# Patient Record
Sex: MALE | Race: ASIAN | NOT HISPANIC OR LATINO
[De-identification: names, ages, dates, MRNs, and addresses within clinical notes are randomized per-mention and may not be internally consistent; named-entity substitution may affect disease eponyms.]

---

## 2017-01-17 ENCOUNTER — APPOINTMENT (OUTPATIENT)
Dept: ORTHOPEDIC SURGERY | Facility: CLINIC | Age: 82
End: 2017-01-17

## 2017-01-30 ENCOUNTER — NON-APPOINTMENT (OUTPATIENT)
Age: 82
End: 2017-01-30

## 2017-01-30 ENCOUNTER — APPOINTMENT (OUTPATIENT)
Dept: CARDIOLOGY | Facility: CLINIC | Age: 82
End: 2017-01-30

## 2017-01-30 VITALS
BODY MASS INDEX: 29.63 KG/M2 | WEIGHT: 207 LBS | HEIGHT: 70 IN | OXYGEN SATURATION: 97 % | DIASTOLIC BLOOD PRESSURE: 74 MMHG | HEART RATE: 65 BPM | SYSTOLIC BLOOD PRESSURE: 137 MMHG

## 2017-01-30 DIAGNOSIS — I25.10 ATHEROSCLEROTIC HEART DISEASE OF NATIVE CORONARY ARTERY W/OUT ANGINA PECTORIS: ICD-10-CM

## 2017-04-03 ENCOUNTER — APPOINTMENT (OUTPATIENT)
Dept: ORTHOPEDIC SURGERY | Facility: CLINIC | Age: 82
End: 2017-04-03

## 2017-04-17 ENCOUNTER — APPOINTMENT (OUTPATIENT)
Dept: CARDIOLOGY | Facility: CLINIC | Age: 82
End: 2017-04-17

## 2017-04-17 ENCOUNTER — NON-APPOINTMENT (OUTPATIENT)
Age: 82
End: 2017-04-17

## 2017-04-17 VITALS
HEIGHT: 70 IN | DIASTOLIC BLOOD PRESSURE: 76 MMHG | BODY MASS INDEX: 29.63 KG/M2 | SYSTOLIC BLOOD PRESSURE: 126 MMHG | OXYGEN SATURATION: 95 % | WEIGHT: 207 LBS | HEART RATE: 67 BPM

## 2017-04-17 RX ORDER — CELECOXIB 400 MG/1
400 CAPSULE ORAL
Qty: 90 | Refills: 0 | Status: DISCONTINUED | COMMUNITY
Start: 2016-08-10 | End: 2017-04-17

## 2017-04-18 LAB
ALBUMIN SERPL ELPH-MCNC: 3.2 G/DL
ALP BLD-CCNC: 123 U/L
ALT SERPL-CCNC: 14 U/L
ANION GAP SERPL CALC-SCNC: 13 MMOL/L
AST SERPL-CCNC: 21 U/L
BILIRUB SERPL-MCNC: 0.5 MG/DL
BUN SERPL-MCNC: 17 MG/DL
CALCIUM SERPL-MCNC: 9.2 MG/DL
CHLORIDE SERPL-SCNC: 99 MMOL/L
CO2 SERPL-SCNC: 26 MMOL/L
CREAT SERPL-MCNC: 0.78 MG/DL
GLUCOSE SERPL-MCNC: 99 MG/DL
POTASSIUM SERPL-SCNC: 4.1 MMOL/L
PROT SERPL-MCNC: 7.5 G/DL
SODIUM SERPL-SCNC: 138 MMOL/L

## 2017-04-26 ENCOUNTER — APPOINTMENT (OUTPATIENT)
Dept: ORTHOPEDIC SURGERY | Facility: CLINIC | Age: 82
End: 2017-04-26

## 2017-05-22 ENCOUNTER — APPOINTMENT (OUTPATIENT)
Dept: CARDIOLOGY | Facility: CLINIC | Age: 82
End: 2017-05-22

## 2017-06-13 ENCOUNTER — APPOINTMENT (OUTPATIENT)
Dept: ORTHOPEDIC SURGERY | Facility: CLINIC | Age: 82
End: 2017-06-13

## 2017-06-29 ENCOUNTER — RX RENEWAL (OUTPATIENT)
Age: 82
End: 2017-06-29

## 2017-07-17 ENCOUNTER — NON-APPOINTMENT (OUTPATIENT)
Age: 82
End: 2017-07-17

## 2017-07-17 ENCOUNTER — APPOINTMENT (OUTPATIENT)
Dept: CARDIOLOGY | Facility: CLINIC | Age: 82
End: 2017-07-17

## 2017-07-17 VITALS
SYSTOLIC BLOOD PRESSURE: 118 MMHG | BODY MASS INDEX: 29.63 KG/M2 | OXYGEN SATURATION: 97 % | WEIGHT: 207 LBS | HEIGHT: 70 IN | DIASTOLIC BLOOD PRESSURE: 69 MMHG | HEART RATE: 78 BPM

## 2017-07-17 DIAGNOSIS — H54.7 UNSPECIFIED VISUAL LOSS: ICD-10-CM

## 2017-07-17 DIAGNOSIS — I35.0 NONRHEUMATIC AORTIC (VALVE) STENOSIS: ICD-10-CM

## 2017-07-17 RX ORDER — POLYETHYLENE GLYCOL 3350 17 G/17G
17 POWDER, FOR SOLUTION ORAL
Qty: 527 | Refills: 0 | Status: DISCONTINUED | COMMUNITY
Start: 2016-11-02 | End: 2017-07-17

## 2017-07-17 RX ORDER — MUPIROCIN 20 MG/G
2 OINTMENT TOPICAL
Qty: 66 | Refills: 0 | Status: DISCONTINUED | COMMUNITY
Start: 2016-09-07 | End: 2017-07-17

## 2017-07-17 RX ORDER — MOXIFLOXACIN HYDROCHLORIDE TABLETS, 400 MG 400 MG/1
400 TABLET, FILM COATED ORAL
Qty: 10 | Refills: 0 | Status: DISCONTINUED | COMMUNITY
Start: 2016-09-07 | End: 2017-07-17

## 2017-07-17 RX ORDER — DICLOFENAC EPOLAMINE 0.01 G/1
1.3 SYSTEM TOPICAL TWICE DAILY
Qty: 1 | Refills: 3 | Status: DISCONTINUED | OUTPATIENT
Start: 2017-04-03 | End: 2017-07-17

## 2017-07-19 ENCOUNTER — FORM ENCOUNTER (OUTPATIENT)
Age: 82
End: 2017-07-19

## 2017-07-20 ENCOUNTER — APPOINTMENT (OUTPATIENT)
Dept: ULTRASOUND IMAGING | Facility: HOSPITAL | Age: 82
End: 2017-07-20

## 2017-07-20 ENCOUNTER — OUTPATIENT (OUTPATIENT)
Dept: OUTPATIENT SERVICES | Facility: HOSPITAL | Age: 82
LOS: 1 days | End: 2017-07-20
Payer: MEDICARE

## 2017-07-20 DIAGNOSIS — H54.7 UNSPECIFIED VISUAL LOSS: ICD-10-CM

## 2017-07-20 PROCEDURE — 93880 EXTRACRANIAL BILAT STUDY: CPT

## 2017-07-26 ENCOUNTER — NON-APPOINTMENT (OUTPATIENT)
Age: 82
End: 2017-07-26

## 2017-07-26 ENCOUNTER — APPOINTMENT (OUTPATIENT)
Dept: ELECTROPHYSIOLOGY | Facility: CLINIC | Age: 82
End: 2017-07-26

## 2017-07-26 VITALS
BODY MASS INDEX: 29.63 KG/M2 | DIASTOLIC BLOOD PRESSURE: 71 MMHG | WEIGHT: 207 LBS | SYSTOLIC BLOOD PRESSURE: 125 MMHG | HEART RATE: 84 BPM | OXYGEN SATURATION: 98 % | HEIGHT: 70 IN

## 2017-07-26 DIAGNOSIS — R06.02 SHORTNESS OF BREATH: ICD-10-CM

## 2017-07-26 DIAGNOSIS — R00.1 BRADYCARDIA, UNSPECIFIED: ICD-10-CM

## 2017-07-26 RX ORDER — OXYCODONE AND ACETAMINOPHEN 5; 325 MG/1; MG/1
5-325 TABLET ORAL
Qty: 60 | Refills: 0 | Status: DISCONTINUED | COMMUNITY
Start: 2016-08-24 | End: 2017-07-26

## 2017-08-20 PROBLEM — R00.1 BRADYCARDIA: Status: ACTIVE | Noted: 2017-08-20

## 2017-09-11 ENCOUNTER — APPOINTMENT (OUTPATIENT)
Dept: CARDIOLOGY | Facility: CLINIC | Age: 82
End: 2017-09-11

## 2017-09-13 ENCOUNTER — APPOINTMENT (OUTPATIENT)
Dept: ORTHOPEDIC SURGERY | Facility: CLINIC | Age: 82
End: 2017-09-13
Payer: MEDICARE

## 2017-09-13 PROCEDURE — 99213 OFFICE O/P EST LOW 20 MIN: CPT | Mod: 25

## 2017-09-13 PROCEDURE — 20610 DRAIN/INJ JOINT/BURSA W/O US: CPT | Mod: 50

## 2017-09-13 RX ORDER — LIDOCAINE 5% 700 MG/1
5 PATCH TOPICAL
Qty: 1 | Refills: 2 | Status: ACTIVE | OUTPATIENT
Start: 2017-09-13

## 2017-10-12 ENCOUNTER — MEDICATION RENEWAL (OUTPATIENT)
Age: 82
End: 2017-10-12

## 2017-10-17 ENCOUNTER — OUTPATIENT (OUTPATIENT)
Dept: INPATIENT UNIT | Facility: HOSPITAL | Age: 82
LOS: 1 days | End: 2017-10-17
Payer: MEDICARE

## 2017-10-17 ENCOUNTER — TRANSCRIPTION ENCOUNTER (OUTPATIENT)
Age: 82
End: 2017-10-17

## 2017-10-17 VITALS
DIASTOLIC BLOOD PRESSURE: 84 MMHG | SYSTOLIC BLOOD PRESSURE: 173 MMHG | OXYGEN SATURATION: 98 % | TEMPERATURE: 98 F | HEIGHT: 70 IN | RESPIRATION RATE: 16 BRPM | HEART RATE: 79 BPM | WEIGHT: 207.01 LBS

## 2017-10-17 DIAGNOSIS — R00.1 BRADYCARDIA, UNSPECIFIED: ICD-10-CM

## 2017-10-17 LAB
ALBUMIN SERPL ELPH-MCNC: 4 G/DL — SIGNIFICANT CHANGE UP (ref 3.3–5)
ALP SERPL-CCNC: 143 U/L — HIGH (ref 40–120)
ALT FLD-CCNC: 12 U/L RC — SIGNIFICANT CHANGE UP (ref 10–45)
ANION GAP SERPL CALC-SCNC: 13 MMOL/L — SIGNIFICANT CHANGE UP (ref 5–17)
APTT BLD: 41.1 SEC — HIGH (ref 27.5–37.4)
AST SERPL-CCNC: 20 U/L — SIGNIFICANT CHANGE UP (ref 10–40)
BILIRUB SERPL-MCNC: 0.6 MG/DL — SIGNIFICANT CHANGE UP (ref 0.2–1.2)
BUN SERPL-MCNC: 12 MG/DL — SIGNIFICANT CHANGE UP (ref 7–23)
CALCIUM SERPL-MCNC: 9.4 MG/DL — SIGNIFICANT CHANGE UP (ref 8.4–10.5)
CHLORIDE SERPL-SCNC: 101 MMOL/L — SIGNIFICANT CHANGE UP (ref 96–108)
CO2 SERPL-SCNC: 28 MMOL/L — SIGNIFICANT CHANGE UP (ref 22–31)
CREAT SERPL-MCNC: 0.77 MG/DL — SIGNIFICANT CHANGE UP (ref 0.5–1.3)
GLUCOSE SERPL-MCNC: 107 MG/DL — HIGH (ref 70–99)
HCT VFR BLD CALC: 45.4 % — SIGNIFICANT CHANGE UP (ref 39–50)
HGB BLD-MCNC: 14.3 G/DL — SIGNIFICANT CHANGE UP (ref 13–17)
INR BLD: 1.55 RATIO — HIGH (ref 0.88–1.16)
MCHC RBC-ENTMCNC: 28.1 PG — SIGNIFICANT CHANGE UP (ref 27–34)
MCHC RBC-ENTMCNC: 31.5 GM/DL — LOW (ref 32–36)
MCV RBC AUTO: 89.1 FL — SIGNIFICANT CHANGE UP (ref 80–100)
PLATELET # BLD AUTO: 201 K/UL — SIGNIFICANT CHANGE UP (ref 150–400)
POTASSIUM SERPL-MCNC: 4.5 MMOL/L — SIGNIFICANT CHANGE UP (ref 3.5–5.3)
POTASSIUM SERPL-SCNC: 4.5 MMOL/L — SIGNIFICANT CHANGE UP (ref 3.5–5.3)
PROT SERPL-MCNC: 8.2 G/DL — SIGNIFICANT CHANGE UP (ref 6–8.3)
PROTHROM AB SERPL-ACNC: 16.9 SEC — HIGH (ref 9.8–12.7)
RBC # BLD: 5.1 M/UL — SIGNIFICANT CHANGE UP (ref 4.2–5.8)
RBC # FLD: 14.2 % — SIGNIFICANT CHANGE UP (ref 10.3–14.5)
SODIUM SERPL-SCNC: 142 MMOL/L — SIGNIFICANT CHANGE UP (ref 135–145)
WBC # BLD: 7.9 K/UL — SIGNIFICANT CHANGE UP (ref 3.8–10.5)
WBC # FLD AUTO: 7.9 K/UL — SIGNIFICANT CHANGE UP (ref 3.8–10.5)

## 2017-10-17 PROCEDURE — 93010 ELECTROCARDIOGRAM REPORT: CPT | Mod: 76

## 2017-10-17 PROCEDURE — 71010: CPT | Mod: 26

## 2017-10-17 PROCEDURE — 33208 INSRT HEART PM ATRIAL & VENT: CPT | Mod: KX

## 2017-10-17 RX ORDER — OXYCODONE AND ACETAMINOPHEN 5; 325 MG/1; MG/1
1 TABLET ORAL THREE TIMES A DAY
Qty: 0 | Refills: 0 | Status: DISCONTINUED | OUTPATIENT
Start: 2017-10-17 | End: 2017-10-18

## 2017-10-17 RX ORDER — FUROSEMIDE 40 MG
40 TABLET ORAL DAILY
Qty: 0 | Refills: 0 | Status: DISCONTINUED | OUTPATIENT
Start: 2017-10-17 | End: 2017-10-18

## 2017-10-17 RX ORDER — CHOLECALCIFEROL (VITAMIN D3) 125 MCG
5000 CAPSULE ORAL DAILY
Qty: 0 | Refills: 0 | Status: DISCONTINUED | OUTPATIENT
Start: 2017-10-17 | End: 2017-10-18

## 2017-10-17 RX ORDER — ASPIRIN/CALCIUM CARB/MAGNESIUM 324 MG
81 TABLET ORAL DAILY
Qty: 0 | Refills: 0 | Status: DISCONTINUED | OUTPATIENT
Start: 2017-10-17 | End: 2017-10-18

## 2017-10-17 RX ORDER — ACETAMINOPHEN 500 MG
650 TABLET ORAL EVERY 6 HOURS
Qty: 0 | Refills: 0 | Status: DISCONTINUED | OUTPATIENT
Start: 2017-10-17 | End: 2017-10-18

## 2017-10-17 RX ORDER — METOPROLOL TARTRATE 50 MG
50 TABLET ORAL
Qty: 0 | Refills: 0 | Status: DISCONTINUED | OUTPATIENT
Start: 2017-10-17 | End: 2017-10-18

## 2017-10-17 RX ORDER — ATORVASTATIN CALCIUM 80 MG/1
20 TABLET, FILM COATED ORAL AT BEDTIME
Qty: 0 | Refills: 0 | Status: DISCONTINUED | OUTPATIENT
Start: 2017-10-17 | End: 2017-10-18

## 2017-10-17 RX ORDER — CEFAZOLIN SODIUM 1 G
1000 VIAL (EA) INJECTION EVERY 8 HOURS
Qty: 0 | Refills: 0 | Status: COMPLETED | OUTPATIENT
Start: 2017-10-17 | End: 2017-10-18

## 2017-10-17 RX ADMIN — Medication 100 MILLIGRAM(S): at 20:42

## 2017-10-17 RX ADMIN — ATORVASTATIN CALCIUM 20 MILLIGRAM(S): 80 TABLET, FILM COATED ORAL at 20:42

## 2017-10-17 RX ADMIN — Medication 650 MILLIGRAM(S): at 19:11

## 2017-10-17 RX ADMIN — Medication 650 MILLIGRAM(S): at 18:41

## 2017-10-17 RX ADMIN — OXYCODONE AND ACETAMINOPHEN 1 TABLET(S): 5; 325 TABLET ORAL at 15:12

## 2017-10-17 RX ADMIN — Medication 50 MILLIGRAM(S): at 20:42

## 2017-10-17 NOTE — DISCHARGE NOTE ADULT - CARE PLAN
Principal Discharge DX:	Atrial fibrillation  Goal:	Your heart rate and rhythm will be controlled.  Instructions for follow-up, activity and diet:	Continue with your cardiologist and primary care MD. Continue your current medications. Call your physician for palpitations, feelings of rapid heart beat, lightheadedness, or dizziness. Continue Xarelto as directed by your doctor.  Secondary Diagnosis:	Hypercholesteremia  Goal:	Your LDL cholesterol will be less than 70mg/dL  Instructions for follow-up, activity and diet:	Continue with your cholesterol medications. Eat a heart healthy diet that is low in saturated fats and salt, and includes whole grains, fruits, vegetables and lean protein; exercise regularly (consult with your physician or cardiologist first); maintain a heart healthy weight; if you smoke - quit (A resource to help you stop smoking is the [facility] – phone number [phone].). Continue to follow with your primary physician or cardiologist.  Secondary Diagnosis:	CHF (congestive heart failure)  Goal:	You will not be short of breath.  Instructions for follow-up, activity and diet:	Take your medications as prescribed. Follow a low-salt, low salt, low cholesterol heart healthy diet. Weigh yourself every day and keep a record; call your doctor if you gain 2 pounds over one to two days or 5 pounds over three days. Get to or maintain a healthy weight; ask your heart failure team for referrals to a registered dietitian if needed. Avoid alcohol. Be active (check with your physician or cardiologist first). Find healthy ways to deal with stress, such as deep breathing, meditation, exercise, and doing hobbies that you enjoy. If you smoke, quit. (A resource to help you stop smoking is the [facility] – phone number [phone].).

## 2017-10-17 NOTE — DISCHARGE NOTE ADULT - PATIENT PORTAL LINK FT
“You can access the Follow[facility] Patient Portal, offered by [facility], by registering with the following website: [email or web address]”

## 2017-10-17 NOTE — DISCHARGE NOTE ADULT - MEDICATION SUMMARY - MEDICATIONS TO TAKE
I will START or STAY ON the medications listed below when I get home from the hospital:    Endocet 5/325 oral tablet  -- 1 tab(s) by mouth once a day  -- Indication: For severe pain    aspirin 81 mg oral tablet  -- 1 tab(s) by mouth once a day MDD:1  -- Indication: For Bradycardia    Tylenol 500 mg oral tablet  -- 1  by mouth once a day, As Needed  -- Indication: For mild to moderate pain    Xarelto 20 mg oral tablet  -- 1 tab(s) by mouth once a day (in the evening) MDD:1  -- Indication: For a-fib    Lipitor 20 mg oral tablet  -- 1 tab(s) by mouth once a day MDD:1  -- Indication: For cholesterol    metoprolol tartrate 50 mg oral tablet  -- 1 tab(s) by mouth 2 times a day MDD:2  -- Indication: For a-fib    Lasix 40 mg oral tablet  -- 1 tab(s) by mouth once a day MDD:1  -- Indication: For diuretics    Vitamin D3 5000 intl units oral capsule  -- 1 cap(s) by mouth once a day MDD:1  -- Indication: For supplement

## 2017-10-17 NOTE — PATIENT PROFILE ADULT. - VISION (WITH CORRECTIVE LENSES IF THE PATIENT USUALLY WEARS THEM):
katia glasses/Partially impaired: cannot see medication labels or newsprint, but can see obstacles in path, and the surrounding layout; can count fingers at arm's length
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
## 2017-10-17 NOTE — H&P CARDIOLOGY - HISTORY OF PRESENT ILLNESS
86 y/o male with h/o CAD and AS s/p CABG and AVR - bioprosthetic in 2012, LE Cellulitis and chronic LE edema, HLD and Afib on Xarelto evaluated by EPS in 7/2017 2/2 c/o dizziness x 2 months.  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  Patient is now for PPM implant for which he presents today.

## 2017-10-17 NOTE — H&P CARDIOLOGY - PSH
Aortic valve replaced  Bovine valve  H/O: knee surgery    History of appendectomy    S/P CABG x 1    S/P inguinal hernia repair  Right inguinal

## 2017-10-17 NOTE — PROGRESS NOTE ADULT - SUBJECTIVE AND OBJECTIVE BOX
S/P PPM implant   telemetry afib 80's  T(C): 36.7 (10-17-17 @ 14:45), Max: 36.8 (10-17-17 @ 09:22)  HR: 89 (10-17-17 @ 16:45) (79 - 89)  BP: 131/61 (10-17-17 @ 16:45) (119/61 - 173/84)  RR: 18 (10-17-17 @ 16:45) (16 - 18)  SpO2: 99% (10-17-17 @ 16:15) (98% - 99%)    Left infraclavicular implant site no hematoma, no bleeding, no ecchymosis  Post Device teaching done with patient and family  PPM card, booklet, and discharge instruction given to patient 's son   monitor telemetry over night   F/U cxray post op official read  to r/o pneumothorax and check lead tip placement  anticipate discharge home tomorrow after IV abx complete, telemetry stable with stable Vs and labs  dispo F/U in EP clinic in 7-10 days for wound check appointment, [phone], on 10/31/17 at 2;25 pm  KATELYN Fajardo NP [id number]

## 2017-10-17 NOTE — DISCHARGE NOTE ADULT - CARE PROVIDER_API CALL
Xiang Yung), Internal Medicine  [address] [place] [address] [id number]  Phone: [phone]  Fax: [phone]

## 2017-10-17 NOTE — DISCHARGE NOTE ADULT - ADDITIONAL INSTRUCTIONS
Follow direction as per EP nurses direction. Follow direction as per EP nurses direction.  Post Device teaching reinforced  with patient   stable on telemetry   F/U cxray post op official read  to r/o pneumothorax and check lead tip placement  anticipate discharge home today from EP perspective after IV abx complete,   dispo F/U in EP clinic in 7-10 days for wound check appointment, [phone]  on 10/31 at 2:25pm Follow direction as per EP nurses direction.  Post Device teaching reinforced  with patient   stable on telemetry   F/U cxray negative for pneumothorax   anticipate discharge home today from EP perspective after IV abx complete,   dispo F/U in EP clinic in 7-10 days for wound check appointment, [phone]  on 10/31 at 2:25pm

## 2017-10-17 NOTE — DISCHARGE NOTE ADULT - HOSPITAL COURSE
86 y/o male with h/o CAD and AS s/p CABG and AVR - bioprosthetic in 2012, LE Cellulitis and chronic LE edema, HLD and Afib on Xarelto evaluated by EPS in 7/2017 2/2 c/o dizziness x 2 months.  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  Patient is now for PPM implant. S/p PPM insertion, VSS. 84 y/o male with h/o CAD and AS s/p CABG and AVR - bioprosthetic in 2012, LE Cellulitis and chronic LE edema, HLD and Afib on Xarelto evaluated by EPS in 7/2017 2/2 c/o dizziness x 2 months.  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  Patient is now for PPM implant. S/p PPM insertion- site stable. Patient stable for discharge today. PT evaluated patient recommended home PT vs. subacute, family is requesting home PT with ambulance to go home.

## 2017-10-17 NOTE — DISCHARGE NOTE ADULT - PLAN OF CARE
Take your medications as prescribed. Follow a low-salt, low salt, low cholesterol heart healthy diet. Weigh yourself every day and keep a record; call your doctor if you gain 2 pounds over one to two days or 5 pounds over three days. Get to or maintain a healthy weight; ask your heart failure team for referrals to a registered dietitian if needed. Avoid alcohol. Be active (check with your physician or cardiologist first). Find healthy ways to deal with stress, such as deep breathing, meditation, exercise, and doing hobbies that you enjoy. If you smoke, quit. (A resource to help you stop smoking is the [facility] – phone number [phone].). Your heart rate and rhythm will be controlled. Continue with your cardiologist and primary care MD. Continue your current medications. Call your physician for palpitations, feelings of rapid heart beat, lightheadedness, or dizziness. Continue Xarelto as directed by your doctor. Your LDL cholesterol will be less than 70mg/dL Continue with your cholesterol medications. Eat a heart healthy diet that is low in saturated fats and salt, and includes whole grains, fruits, vegetables and lean protein; exercise regularly (consult with your physician or cardiologist first); maintain a heart healthy weight; if you smoke - quit (A resource to help you stop smoking is the [facility] – phone number [phone].). Continue to follow with your primary physician or cardiologist. You will not be short of breath.

## 2017-10-17 NOTE — DISCHARGE NOTE ADULT - CONDITION (STATED IN TERMS THAT PERMIT A SPECIFIC MEASURABLE COMPARISON WITH CONDITION ON ADMISSION):
at time of discharge patient is ambulated with walker and 1 person assist. site WDL, no hematoma, CXR at time of discharge patient is ambulated with walker and 1 person assist. site WDL, no hematoma, CXR negative for pneuo

## 2017-10-18 VITALS
RESPIRATION RATE: 16 BRPM | TEMPERATURE: 98 F | OXYGEN SATURATION: 98 % | HEART RATE: 50 BPM | DIASTOLIC BLOOD PRESSURE: 58 MMHG | SYSTOLIC BLOOD PRESSURE: 107 MMHG

## 2017-10-18 DIAGNOSIS — R00.1 BRADYCARDIA, UNSPECIFIED: ICD-10-CM

## 2017-10-18 DIAGNOSIS — I48.91 UNSPECIFIED ATRIAL FIBRILLATION: ICD-10-CM

## 2017-10-18 LAB
ANION GAP SERPL CALC-SCNC: 12 MMOL/L — SIGNIFICANT CHANGE UP (ref 5–17)
BUN SERPL-MCNC: 13 MG/DL — SIGNIFICANT CHANGE UP (ref 7–23)
CALCIUM SERPL-MCNC: 8.8 MG/DL — SIGNIFICANT CHANGE UP (ref 8.4–10.5)
CHLORIDE SERPL-SCNC: 103 MMOL/L — SIGNIFICANT CHANGE UP (ref 96–108)
CO2 SERPL-SCNC: 26 MMOL/L — SIGNIFICANT CHANGE UP (ref 22–31)
CREAT SERPL-MCNC: 0.68 MG/DL — SIGNIFICANT CHANGE UP (ref 0.5–1.3)
GLUCOSE SERPL-MCNC: 101 MG/DL — HIGH (ref 70–99)
HCT VFR BLD CALC: 40.3 % — SIGNIFICANT CHANGE UP (ref 39–50)
HGB BLD-MCNC: 13.6 G/DL — SIGNIFICANT CHANGE UP (ref 13–17)
MCHC RBC-ENTMCNC: 29.9 PG — SIGNIFICANT CHANGE UP (ref 27–34)
MCHC RBC-ENTMCNC: 33.8 GM/DL — SIGNIFICANT CHANGE UP (ref 32–36)
MCV RBC AUTO: 88.5 FL — SIGNIFICANT CHANGE UP (ref 80–100)
PLATELET # BLD AUTO: 182 K/UL — SIGNIFICANT CHANGE UP (ref 150–400)
POTASSIUM SERPL-MCNC: 4.2 MMOL/L — SIGNIFICANT CHANGE UP (ref 3.5–5.3)
POTASSIUM SERPL-SCNC: 4.2 MMOL/L — SIGNIFICANT CHANGE UP (ref 3.5–5.3)
RBC # BLD: 4.56 M/UL — SIGNIFICANT CHANGE UP (ref 4.2–5.8)
RBC # FLD: 14.2 % — SIGNIFICANT CHANGE UP (ref 10.3–14.5)
SODIUM SERPL-SCNC: 141 MMOL/L — SIGNIFICANT CHANGE UP (ref 135–145)
WBC # BLD: 7 K/UL — SIGNIFICANT CHANGE UP (ref 3.8–10.5)
WBC # FLD AUTO: 7 K/UL — SIGNIFICANT CHANGE UP (ref 3.8–10.5)

## 2017-10-18 PROCEDURE — C1892: CPT

## 2017-10-18 PROCEDURE — 71045 X-RAY EXAM CHEST 1 VIEW: CPT

## 2017-10-18 PROCEDURE — C1785: CPT

## 2017-10-18 PROCEDURE — G8980: CPT

## 2017-10-18 PROCEDURE — 85730 THROMBOPLASTIN TIME PARTIAL: CPT

## 2017-10-18 PROCEDURE — 93005 ELECTROCARDIOGRAM TRACING: CPT

## 2017-10-18 PROCEDURE — 85610 PROTHROMBIN TIME: CPT

## 2017-10-18 PROCEDURE — C1898: CPT

## 2017-10-18 PROCEDURE — G8979: CPT

## 2017-10-18 PROCEDURE — 80048 BASIC METABOLIC PNL TOTAL CA: CPT

## 2017-10-18 PROCEDURE — 93010 ELECTROCARDIOGRAM REPORT: CPT

## 2017-10-18 PROCEDURE — 80053 COMPREHEN METABOLIC PANEL: CPT

## 2017-10-18 PROCEDURE — G8978: CPT

## 2017-10-18 PROCEDURE — 97161 PT EVAL LOW COMPLEX 20 MIN: CPT

## 2017-10-18 PROCEDURE — 85027 COMPLETE CBC AUTOMATED: CPT

## 2017-10-18 PROCEDURE — 33208 INSRT HEART PM ATRIAL & VENT: CPT | Mod: KX

## 2017-10-18 PROCEDURE — C1894: CPT

## 2017-10-18 RX ADMIN — Medication 40 MILLIGRAM(S): at 05:06

## 2017-10-18 RX ADMIN — Medication 50 MILLIGRAM(S): at 05:06

## 2017-10-18 RX ADMIN — OXYCODONE AND ACETAMINOPHEN 1 TABLET(S): 5; 325 TABLET ORAL at 13:00

## 2017-10-18 RX ADMIN — OXYCODONE AND ACETAMINOPHEN 1 TABLET(S): 5; 325 TABLET ORAL at 12:05

## 2017-10-18 RX ADMIN — Medication 650 MILLIGRAM(S): at 04:53

## 2017-10-18 RX ADMIN — Medication 81 MILLIGRAM(S): at 12:05

## 2017-10-18 RX ADMIN — Medication 100 MILLIGRAM(S): at 12:05

## 2017-10-18 RX ADMIN — Medication 100 MILLIGRAM(S): at 04:30

## 2017-10-18 RX ADMIN — Medication 650 MILLIGRAM(S): at 05:23

## 2017-10-18 NOTE — PROGRESS NOTE ADULT - PROBLEM SELECTOR PLAN 2
resume Xarelto for AC tonight  c/w metoprolol for rate control   F/U PCP and primary cardiology 7 -10 days after discharge from the hospital resume Xarelto for AC tonight  monitor implant pocket for bleeding with AC  c/w metoprolol for rate control   F/U PCP and primary cardiology 7 -10 days after discharge from the hospital

## 2017-10-18 NOTE — PROGRESS NOTE ADULT - SUBJECTIVE AND OBJECTIVE BOX
HPI: no over night events    MEDICATIONS  (STANDING):  aspirin enteric coated 81 milliGRAM(s) Oral daily  atorvastatin 20 milliGRAM(s) Oral at bedtime  ceFAZolin   IVPB 1000 milliGRAM(s) IV Intermittent every 8 hours  cholecalciferol 5000 Unit(s) Oral daily  furosemide    Tablet 40 milliGRAM(s) Oral daily  metoprolol 50 milliGRAM(s) Oral two times a day    MEDICATIONS  (PRN):  acetaminophen   Tablet. 650 milliGRAM(s) Oral every 6 hours PRN Moderate Pain (4 - 6)  oxyCODONE    5 mG/acetaminophen 325 mG 1 Tablet(s) Oral three times a day PRN Severe Pain (7 - 10)    Allergies No Known Drug Allergies  Seafood (Unknown)    Intolerances    REVIEW OF SYSTEM:    Constitutional: denies fever, chills, fatigue  Neuro: denies headache, numbness, weakness, dizziness  Resp: denies cough, wheezing, shortness of breath  CVS: denies chest pain, palpitations,  + leg swelling  GI: denies abdominal pain, nausea, vomiting, diarrhea   : denies dysuria, frequency, incontinence  Skin: denies itching, burning, rashes, or lesions   Msk: c/o device implant site pain     Vital Signs Last 24 Hrs  T(C): 36.4 (18 Oct 2017 07:40), Max: 36.7 (17 Oct 2017 14:45)  T(F): 97.6 (18 Oct 2017 07:40), Max: 98 (17 Oct 2017 14:45)  HR: 66 (18 Oct 2017 07:40) (66 - 92)  BP: 126/52 (18 Oct 2017 07:40) (113/62 - 139/70)  RR: 18 (18 Oct 2017 07:40) (16 - 18)  SpO2: 99% (18 Oct 2017 07:40) (97% - 99%)    Physical Exam:  General : frail elderly  no acute distress  Neuro : Alert and oriented x 3  HEENT : neck supple   Lungs: Clear to Ascultation, no wheezing , rales or rhonchi   Cardiovascular : + 1 +2, RRR, no murmurs, no rubs  GI : abdomen soft, NT, ND, + BS   : no suprapubic tenderness  Extremities : lower extremity edema feet warm   Skin : Left infraclavicular implant site intact,  no hematoma, no bleeding,  no ecchymosis,     TELE:SR ST 80 -100, occ AV paced V paced   EKG: SR 66 bpm 1st degree AVB V paced      LABS:                        13.6   7.0   )-----------( 182      ( 18 Oct 2017 04:37 )             40.3     10-18    141  |  103  |  13  ----------------------------<  101<H>  4.2   |  26  |  0.68    Ca    8.8      18 Oct 2017 04:37    TPro  8.2  /  Alb  4.0  /  TBili  0.6  /  DBili  x   /  AST  20  /  ALT  12  /  AlkPhos  143<H>  10-17    PT/INR - ( 17 Oct 2017 09:32 )   PT: 16.9 sec;   INR: 1.55 ratio    PTT - ( 17 Oct 2017 09:32 )  PTT:41.1 sec    RADIOLOGY & ADDITIONAL TESTS:  Chest Xray pending official report

## 2017-10-18 NOTE — PHYSICAL THERAPY INITIAL EVALUATION ADULT - DISCHARGE DISPOSITION, PT EVAL
rehabilitation facility/subacute rehab; if d/c'ed home pt will require home PT, assist with all OOB activity and RW for ambulation (pt owns)

## 2017-10-18 NOTE — PHYSICAL THERAPY INITIAL EVALUATION ADULT - PERTINENT HX OF CURRENT PROBLEM, REHAB EVAL
84 y/o male with h/o CAD and AS s/p CABG and AVR - bioprosthetic in 2012, LE Cellulitis and chronic LE edema, HLD and Afib on Xarelto evaluated by EPS in 7/2017 2/2 c/o dizziness x 2 months.  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  Patient is now for PPM implant.

## 2017-10-18 NOTE — PROGRESS NOTE ADULT - PROBLEM SELECTOR PLAN 1
s/p PPM implant 10/17/17  Post Device teaching reinforced  with patient   stable on telemetry   F/U cxray post op offical read  to r/o pneumothorax and check lead tip placement  anticipate discharge home today from EP perspective after IV abx complete,   dispo F/U in EP clinic in 7-10 days for wound check appointment, [phone]  on 10/31 at 2:25pm    KATELYN Fajardo NP [id number]

## 2017-10-18 NOTE — PROGRESS NOTE ADULT - ASSESSMENT
86 y/o male with h/o HLD, CAD and AS s/p CABG AVR - bioprosthetic in 2012, HFrEF 45% LE Cellulitis and chronic LE edema, Afib on Xarelto evaluated by EPS in 7/2017 2/2 c/o dizziness x 2 months.  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 S/P PPM implant 10/17/17

## 2017-10-31 ENCOUNTER — APPOINTMENT (OUTPATIENT)
Dept: ELECTROPHYSIOLOGY | Facility: CLINIC | Age: 82
End: 2017-10-31
Payer: MEDICARE

## 2017-10-31 ENCOUNTER — NON-APPOINTMENT (OUTPATIENT)
Age: 82
End: 2017-10-31

## 2017-10-31 VITALS
BODY MASS INDEX: 29.63 KG/M2 | WEIGHT: 207 LBS | HEIGHT: 70 IN | RESPIRATION RATE: 16 BRPM | HEART RATE: 63 BPM | SYSTOLIC BLOOD PRESSURE: 113 MMHG | OXYGEN SATURATION: 94 % | DIASTOLIC BLOOD PRESSURE: 76 MMHG

## 2017-10-31 PROCEDURE — 99024 POSTOP FOLLOW-UP VISIT: CPT

## 2017-11-13 ENCOUNTER — APPOINTMENT (OUTPATIENT)
Dept: CARDIOLOGY | Facility: CLINIC | Age: 82
End: 2017-11-13
Payer: MEDICARE

## 2017-11-13 ENCOUNTER — NON-APPOINTMENT (OUTPATIENT)
Age: 82
End: 2017-11-13

## 2017-11-13 VITALS
DIASTOLIC BLOOD PRESSURE: 73 MMHG | HEIGHT: 70 IN | OXYGEN SATURATION: 99 % | HEART RATE: 65 BPM | SYSTOLIC BLOOD PRESSURE: 114 MMHG

## 2017-11-13 DIAGNOSIS — R78.81 BACTEREMIA: ICD-10-CM

## 2017-11-13 PROCEDURE — 93000 ELECTROCARDIOGRAM COMPLETE: CPT

## 2017-11-13 PROCEDURE — 99215 OFFICE O/P EST HI 40 MIN: CPT

## 2017-11-15 ENCOUNTER — NON-APPOINTMENT (OUTPATIENT)
Age: 82
End: 2017-11-15

## 2017-11-15 ENCOUNTER — APPOINTMENT (OUTPATIENT)
Dept: ELECTROPHYSIOLOGY | Facility: CLINIC | Age: 82
End: 2017-11-15
Payer: MEDICARE

## 2017-11-15 VITALS — SYSTOLIC BLOOD PRESSURE: 114 MMHG | DIASTOLIC BLOOD PRESSURE: 77 MMHG

## 2017-11-15 PROCEDURE — 99024 POSTOP FOLLOW-UP VISIT: CPT

## 2017-11-15 PROCEDURE — 93000 ELECTROCARDIOGRAM COMPLETE: CPT

## 2017-11-15 RX ORDER — OXYCODONE AND ACETAMINOPHEN 5; 325 MG/1; MG/1
5-325 TABLET ORAL
Refills: 0 | Status: DISCONTINUED | COMMUNITY
Start: 2017-07-17 | End: 2017-11-15

## 2017-11-15 RX ORDER — ASPIRIN ENTERIC COATED TABLETS 81 MG 81 MG/1
81 TABLET, DELAYED RELEASE ORAL
Refills: 0 | Status: ACTIVE | COMMUNITY

## 2017-11-15 RX ORDER — LIDOCAINE 5 G/100G
5 OINTMENT TOPICAL
Qty: 3 | Refills: 2 | Status: DISCONTINUED | OUTPATIENT
Start: 2017-09-13 | End: 2017-11-15

## 2017-11-23 ENCOUNTER — NON-APPOINTMENT (OUTPATIENT)
Age: 82
End: 2017-11-23

## 2017-12-06 ENCOUNTER — APPOINTMENT (OUTPATIENT)
Dept: ORTHOPEDIC SURGERY | Facility: CLINIC | Age: 82
End: 2017-12-06
Payer: MEDICARE

## 2017-12-06 PROCEDURE — 99213 OFFICE O/P EST LOW 20 MIN: CPT | Mod: 25

## 2017-12-06 PROCEDURE — 20610 DRAIN/INJ JOINT/BURSA W/O US: CPT | Mod: 50

## 2017-12-18 ENCOUNTER — APPOINTMENT (OUTPATIENT)
Dept: CARDIOLOGY | Facility: CLINIC | Age: 82
End: 2017-12-18

## 2018-02-06 ENCOUNTER — APPOINTMENT (OUTPATIENT)
Dept: ORTHOPEDIC SURGERY | Facility: CLINIC | Age: 83
End: 2018-02-06
Payer: MEDICARE

## 2018-02-06 DIAGNOSIS — Z47.1 AFTERCARE FOLLOWING JOINT REPLACEMENT SURGERY: ICD-10-CM

## 2018-02-06 PROCEDURE — 99213 OFFICE O/P EST LOW 20 MIN: CPT | Mod: 25

## 2018-02-06 PROCEDURE — 20610 DRAIN/INJ JOINT/BURSA W/O US: CPT | Mod: 50

## 2018-02-07 ENCOUNTER — CHART COPY (OUTPATIENT)
Age: 83
End: 2018-02-07

## 2018-02-08 ENCOUNTER — MEDICATION RENEWAL (OUTPATIENT)
Age: 83
End: 2018-02-08

## 2018-03-28 ENCOUNTER — MEDICATION RENEWAL (OUTPATIENT)
Age: 83
End: 2018-03-28

## 2018-04-30 ENCOUNTER — NON-APPOINTMENT (OUTPATIENT)
Age: 83
End: 2018-04-30

## 2018-04-30 ENCOUNTER — APPOINTMENT (OUTPATIENT)
Dept: CARDIOLOGY | Facility: CLINIC | Age: 83
End: 2018-04-30
Payer: MEDICARE

## 2018-04-30 VITALS
BODY MASS INDEX: 29.63 KG/M2 | WEIGHT: 207 LBS | SYSTOLIC BLOOD PRESSURE: 137 MMHG | HEART RATE: 66 BPM | DIASTOLIC BLOOD PRESSURE: 60 MMHG | OXYGEN SATURATION: 99 % | HEIGHT: 70 IN

## 2018-04-30 DIAGNOSIS — R60.9 EDEMA, UNSPECIFIED: ICD-10-CM

## 2018-04-30 PROCEDURE — 99215 OFFICE O/P EST HI 40 MIN: CPT

## 2018-04-30 PROCEDURE — 93000 ELECTROCARDIOGRAM COMPLETE: CPT

## 2018-04-30 RX ORDER — METRONIDAZOLE 500 MG/1
500 TABLET ORAL 3 TIMES DAILY
Refills: 0 | Status: DISCONTINUED | COMMUNITY
Start: 2017-11-13 | End: 2018-04-30

## 2018-05-15 ENCOUNTER — APPOINTMENT (OUTPATIENT)
Dept: ORTHOPEDIC SURGERY | Facility: CLINIC | Age: 83
End: 2018-05-15
Payer: MEDICARE

## 2018-05-15 PROCEDURE — 20610 DRAIN/INJ JOINT/BURSA W/O US: CPT | Mod: 50

## 2018-05-15 PROCEDURE — 99213 OFFICE O/P EST LOW 20 MIN: CPT | Mod: 25

## 2018-08-07 ENCOUNTER — APPOINTMENT (OUTPATIENT)
Dept: ORTHOPEDIC SURGERY | Facility: CLINIC | Age: 83
End: 2018-08-07
Payer: MEDICARE

## 2018-08-07 VITALS
HEART RATE: 71 BPM | SYSTOLIC BLOOD PRESSURE: 136 MMHG | DIASTOLIC BLOOD PRESSURE: 72 MMHG | HEIGHT: 70 IN | WEIGHT: 207 LBS | BODY MASS INDEX: 29.63 KG/M2

## 2018-08-07 DIAGNOSIS — Z82.61 FAMILY HISTORY OF ARTHRITIS: ICD-10-CM

## 2018-08-07 DIAGNOSIS — Z87.39 PERSONAL HISTORY OF OTHER DISEASES OF THE MUSCULOSKELETAL SYSTEM AND CONNECTIVE TISSUE: ICD-10-CM

## 2018-08-07 PROCEDURE — 20610 DRAIN/INJ JOINT/BURSA W/O US: CPT | Mod: 50

## 2018-08-07 PROCEDURE — 99213 OFFICE O/P EST LOW 20 MIN: CPT | Mod: 25

## 2018-10-05 ENCOUNTER — RX RENEWAL (OUTPATIENT)
Age: 83
End: 2018-10-05

## 2018-10-05 RX ORDER — OXYCODONE AND ACETAMINOPHEN 5; 325 MG/1; MG/1
5-325 TABLET ORAL EVERY 6 HOURS
Qty: 30 | Refills: 0 | Status: ACTIVE | COMMUNITY
Start: 2018-06-28 | End: 1900-01-01

## 2018-10-30 ENCOUNTER — APPOINTMENT (OUTPATIENT)
Dept: ORTHOPEDIC SURGERY | Facility: CLINIC | Age: 83
End: 2018-10-30
Payer: MEDICARE

## 2018-10-30 PROCEDURE — 99213 OFFICE O/P EST LOW 20 MIN: CPT | Mod: 25

## 2018-10-30 PROCEDURE — 20610 DRAIN/INJ JOINT/BURSA W/O US: CPT | Mod: 50

## 2018-10-30 RX ORDER — OXYCODONE AND ACETAMINOPHEN 5; 325 MG/1; MG/1
5-325 TABLET ORAL EVERY 6 HOURS
Qty: 120 | Refills: 0 | Status: ACTIVE | COMMUNITY
Start: 2018-10-30 | End: 1900-01-01

## 2018-10-30 RX ORDER — LIDOCAINE 5 G/100G
5 OINTMENT TOPICAL
Qty: 1 | Refills: 5 | Status: ACTIVE | COMMUNITY
Start: 2018-10-30 | End: 1900-01-01

## 2019-01-01 ENCOUNTER — APPOINTMENT (OUTPATIENT)
Dept: ELECTROPHYSIOLOGY | Facility: CLINIC | Age: 84
End: 2019-01-01

## 2019-01-01 ENCOUNTER — APPOINTMENT (OUTPATIENT)
Dept: ORTHOPEDIC SURGERY | Facility: CLINIC | Age: 84
End: 2019-01-01
Payer: MEDICARE

## 2019-01-01 ENCOUNTER — OUTPATIENT (OUTPATIENT)
Dept: OUTPATIENT SERVICES | Facility: HOSPITAL | Age: 84
LOS: 1 days | End: 2019-01-01
Payer: MEDICARE

## 2019-01-01 ENCOUNTER — OUTPATIENT (OUTPATIENT)
Dept: OUTPATIENT SERVICES | Facility: HOSPITAL | Age: 84
LOS: 1 days | Discharge: HOME | End: 2019-01-01
Payer: MEDICARE

## 2019-01-01 ENCOUNTER — NON-APPOINTMENT (OUTPATIENT)
Age: 84
End: 2019-01-01

## 2019-01-01 ENCOUNTER — APPOINTMENT (OUTPATIENT)
Dept: WOUND CARE | Facility: HOSPITAL | Age: 84
End: 2019-01-01
Payer: MEDICARE

## 2019-01-01 ENCOUNTER — APPOINTMENT (OUTPATIENT)
Dept: SURGERY | Facility: HOSPITAL | Age: 84
End: 2019-01-01

## 2019-01-01 ENCOUNTER — APPOINTMENT (OUTPATIENT)
Dept: ELECTROPHYSIOLOGY | Facility: CLINIC | Age: 84
End: 2019-01-01
Payer: MEDICARE

## 2019-01-01 ENCOUNTER — APPOINTMENT (OUTPATIENT)
Dept: CARDIOLOGY | Facility: CLINIC | Age: 84
End: 2019-01-01
Payer: MEDICARE

## 2019-01-01 ENCOUNTER — MEDICATION RENEWAL (OUTPATIENT)
Age: 84
End: 2019-01-01

## 2019-01-01 VITALS
OXYGEN SATURATION: 95 % | HEIGHT: 70 IN | TEMPERATURE: 96.9 F | SYSTOLIC BLOOD PRESSURE: 122 MMHG | HEART RATE: 80 BPM | DIASTOLIC BLOOD PRESSURE: 67 MMHG | RESPIRATION RATE: 18 BRPM | WEIGHT: 207 LBS | BODY MASS INDEX: 29.63 KG/M2

## 2019-01-01 VITALS
RESPIRATION RATE: 20 BRPM | TEMPERATURE: 97.3 F | HEIGHT: 70 IN | WEIGHT: 207 LBS | BODY MASS INDEX: 29.63 KG/M2 | HEART RATE: 82 BPM | SYSTOLIC BLOOD PRESSURE: 130 MMHG | DIASTOLIC BLOOD PRESSURE: 72 MMHG

## 2019-01-01 VITALS
BODY MASS INDEX: 29.63 KG/M2 | SYSTOLIC BLOOD PRESSURE: 129 MMHG | WEIGHT: 207 LBS | DIASTOLIC BLOOD PRESSURE: 73 MMHG | HEART RATE: 72 BPM | HEIGHT: 70 IN

## 2019-01-01 VITALS
OXYGEN SATURATION: 96 % | WEIGHT: 207 LBS | BODY MASS INDEX: 29.63 KG/M2 | HEART RATE: 76 BPM | SYSTOLIC BLOOD PRESSURE: 103 MMHG | HEIGHT: 70 IN | DIASTOLIC BLOOD PRESSURE: 61 MMHG

## 2019-01-01 VITALS
HEIGHT: 70 IN | OXYGEN SATURATION: 99 % | HEART RATE: 70 BPM | WEIGHT: 207 LBS | SYSTOLIC BLOOD PRESSURE: 126 MMHG | DIASTOLIC BLOOD PRESSURE: 68 MMHG | BODY MASS INDEX: 29.63 KG/M2

## 2019-01-01 VITALS
HEIGHT: 70 IN | SYSTOLIC BLOOD PRESSURE: 119 MMHG | DIASTOLIC BLOOD PRESSURE: 71 MMHG | OXYGEN SATURATION: 99 % | HEART RATE: 77 BPM

## 2019-01-01 VITALS
DIASTOLIC BLOOD PRESSURE: 72 MMHG | HEIGHT: 70 IN | WEIGHT: 207 LBS | RESPIRATION RATE: 20 BRPM | HEART RATE: 72 BPM | BODY MASS INDEX: 29.63 KG/M2 | TEMPERATURE: 97.2 F | OXYGEN SATURATION: 98 % | SYSTOLIC BLOOD PRESSURE: 130 MMHG

## 2019-01-01 VITALS
BODY MASS INDEX: 29.63 KG/M2 | HEART RATE: 88 BPM | SYSTOLIC BLOOD PRESSURE: 131 MMHG | OXYGEN SATURATION: 96 % | TEMPERATURE: 97.3 F | WEIGHT: 207 LBS | HEIGHT: 70 IN | DIASTOLIC BLOOD PRESSURE: 74 MMHG | RESPIRATION RATE: 20 BRPM

## 2019-01-01 VITALS
OXYGEN SATURATION: 98 % | DIASTOLIC BLOOD PRESSURE: 61 MMHG | SYSTOLIC BLOOD PRESSURE: 124 MMHG | BODY MASS INDEX: 29.63 KG/M2 | HEIGHT: 70 IN | HEART RATE: 75 BPM | TEMPERATURE: 97.6 F | WEIGHT: 207 LBS | RESPIRATION RATE: 20 BRPM

## 2019-01-01 VITALS
OXYGEN SATURATION: 97 % | SYSTOLIC BLOOD PRESSURE: 142 MMHG | BODY MASS INDEX: 29.63 KG/M2 | HEIGHT: 70 IN | HEART RATE: 97 BPM | TEMPERATURE: 97 F | RESPIRATION RATE: 20 BRPM | WEIGHT: 207 LBS | DIASTOLIC BLOOD PRESSURE: 82 MMHG

## 2019-01-01 VITALS
SYSTOLIC BLOOD PRESSURE: 121 MMHG | DIASTOLIC BLOOD PRESSURE: 70 MMHG | OXYGEN SATURATION: 99 % | BODY MASS INDEX: 29.63 KG/M2 | HEART RATE: 66 BPM | HEIGHT: 70 IN | WEIGHT: 207 LBS

## 2019-01-01 DIAGNOSIS — I47.2 VENTRICULAR TACHYCARDIA: ICD-10-CM

## 2019-01-01 DIAGNOSIS — I83.018 VARICOSE VEINS OF RIGHT LOWER EXTREMITY WITH ULCER OTHER PART OF LOWER LEG: ICD-10-CM

## 2019-01-01 DIAGNOSIS — I42.9 CARDIOMYOPATHY, UNSPECIFIED: ICD-10-CM

## 2019-01-01 DIAGNOSIS — I83.228 VARICOSE VEINS OF LEFT LOWER EXTREMITY WITH BOTH ULCER OF OTHER PART OF LOWER EXTREMITY AND INFLAMMATION: ICD-10-CM

## 2019-01-01 DIAGNOSIS — K21.9 GASTRO-ESOPHAGEAL REFLUX DISEASE WITHOUT ESOPHAGITIS: ICD-10-CM

## 2019-01-01 DIAGNOSIS — I25.810 ATHEROSCLEROSIS OF CORONARY ARTERY BYPASS GRAFT(S) WITHOUT ANGINA PECTORIS: ICD-10-CM

## 2019-01-01 DIAGNOSIS — M17.0 BILATERAL PRIMARY OSTEOARTHRITIS OF KNEE: ICD-10-CM

## 2019-01-01 DIAGNOSIS — Z96.651 PRESENCE OF RIGHT ARTIFICIAL KNEE JOINT: ICD-10-CM

## 2019-01-01 DIAGNOSIS — Z95.1 PRESENCE OF AORTOCORONARY BYPASS GRAFT: ICD-10-CM

## 2019-01-01 DIAGNOSIS — Z79.82 LONG TERM (CURRENT) USE OF ASPIRIN: ICD-10-CM

## 2019-01-01 DIAGNOSIS — I35.0 NONRHEUMATIC AORTIC (VALVE) STENOSIS: ICD-10-CM

## 2019-01-01 DIAGNOSIS — L03.116 CELLULITIS OF LEFT LOWER LIMB: ICD-10-CM

## 2019-01-01 DIAGNOSIS — Z87.01 PERSONAL HISTORY OF PNEUMONIA (RECURRENT): ICD-10-CM

## 2019-01-01 DIAGNOSIS — L97.811 NON-PRESSURE CHRONIC ULCER OF OTHER PART OF RIGHT LOWER LEG LIMITED TO BREAKDOWN OF SKIN: ICD-10-CM

## 2019-01-01 DIAGNOSIS — I50.9 HEART FAILURE, UNSPECIFIED: ICD-10-CM

## 2019-01-01 DIAGNOSIS — Z82.61 FAMILY HISTORY OF ARTHRITIS: ICD-10-CM

## 2019-01-01 DIAGNOSIS — I48.91 UNSPECIFIED ATRIAL FIBRILLATION: ICD-10-CM

## 2019-01-01 DIAGNOSIS — E78.5 HYPERLIPIDEMIA, UNSPECIFIED: ICD-10-CM

## 2019-01-01 DIAGNOSIS — L97.821 NON-PRESSURE CHRONIC ULCER OF OTHER PART OF LEFT LOWER LEG LIMITED TO BREAKDOWN OF SKIN: ICD-10-CM

## 2019-01-01 DIAGNOSIS — Z82.0 FAMILY HISTORY OF EPILEPSY AND OTHER DISEASES OF THE NERVOUS SYSTEM: ICD-10-CM

## 2019-01-01 DIAGNOSIS — Z95.4 PRESENCE OF OTHER HEART-VALVE REPLACEMENT: ICD-10-CM

## 2019-01-01 DIAGNOSIS — Z79.899 OTHER LONG TERM (CURRENT) DRUG THERAPY: ICD-10-CM

## 2019-01-01 DIAGNOSIS — R32 UNSPECIFIED URINARY INCONTINENCE: ICD-10-CM

## 2019-01-01 DIAGNOSIS — I83.028 VARICOSE VEINS OF LEFT LOWER EXTREMITY WITH ULCER OTHER PART OF LOWER LEG: ICD-10-CM

## 2019-01-01 DIAGNOSIS — H54.7 UNSPECIFIED VISUAL LOSS: ICD-10-CM

## 2019-01-01 DIAGNOSIS — M17.12 UNILATERAL PRIMARY OSTEOARTHRITIS, LEFT KNEE: ICD-10-CM

## 2019-01-01 DIAGNOSIS — Z95.0 PRESENCE OF CARDIAC PACEMAKER: ICD-10-CM

## 2019-01-01 DIAGNOSIS — I25.2 OLD MYOCARDIAL INFARCTION: ICD-10-CM

## 2019-01-01 DIAGNOSIS — Z96.641 PRESENCE OF RIGHT ARTIFICIAL HIP JOINT: ICD-10-CM

## 2019-01-01 DIAGNOSIS — L97.829 VARICOSE VEINS OF LEFT LOWER EXTREMITY WITH ULCER OTHER PART OF LOWER LEG: ICD-10-CM

## 2019-01-01 DIAGNOSIS — I25.10 ATHEROSCLEROTIC HEART DISEASE OF NATIVE CORONARY ARTERY W/OUT ANGINA PECTORIS: ICD-10-CM

## 2019-01-01 DIAGNOSIS — L97.801 NON-PRESSURE CHRONIC ULCER OF OTHER PART OF UNSPECIFIED LOWER LEG LIMITED TO BREAKDOWN OF SKIN: ICD-10-CM

## 2019-01-01 DIAGNOSIS — I44.1 ATRIOVENTRICULAR BLOCK, SECOND DEGREE: ICD-10-CM

## 2019-01-01 PROCEDURE — 93280 PM DEVICE PROGR EVAL DUAL: CPT

## 2019-01-01 PROCEDURE — G0463: CPT

## 2019-01-01 PROCEDURE — 99213 OFFICE O/P EST LOW 20 MIN: CPT

## 2019-01-01 PROCEDURE — 99214 OFFICE O/P EST MOD 30 MIN: CPT

## 2019-01-01 PROCEDURE — 99204 OFFICE O/P NEW MOD 45 MIN: CPT

## 2019-01-01 PROCEDURE — 93923 UPR/LXTR ART STDY 3+ LVLS: CPT | Mod: 26

## 2019-01-01 PROCEDURE — 99213 OFFICE O/P EST LOW 20 MIN: CPT | Mod: 25

## 2019-01-01 PROCEDURE — 93923 UPR/LXTR ART STDY 3+ LVLS: CPT

## 2019-01-01 PROCEDURE — 93000 ELECTROCARDIOGRAM COMPLETE: CPT

## 2019-01-01 PROCEDURE — 20610 DRAIN/INJ JOINT/BURSA W/O US: CPT | Mod: 50

## 2019-01-01 RX ORDER — CHROMIUM 200 MCG
TABLET ORAL DAILY
Refills: 0 | Status: ACTIVE | COMMUNITY

## 2019-01-01 RX ORDER — MULTIVITAMIN
CAPSULE ORAL DAILY
Refills: 0 | Status: ACTIVE | COMMUNITY

## 2019-01-01 RX ORDER — DOXYCYCLINE HYCLATE 100 MG/1
100 CAPSULE ORAL
Qty: 20 | Refills: 1 | Status: ACTIVE | COMMUNITY
Start: 2019-01-01 | End: 1900-01-01

## 2019-01-01 RX ORDER — LIDOCAINE 5 G/100G
5 OINTMENT TOPICAL
Qty: 1 | Refills: 1 | Status: ACTIVE | COMMUNITY
Start: 2019-01-01 | End: 1900-01-01

## 2019-01-07 ENCOUNTER — APPOINTMENT (OUTPATIENT)
Dept: CARDIOLOGY | Facility: CLINIC | Age: 84
End: 2019-01-07

## 2019-01-29 ENCOUNTER — MEDICATION RENEWAL (OUTPATIENT)
Age: 84
End: 2019-01-29

## 2019-01-29 ENCOUNTER — APPOINTMENT (OUTPATIENT)
Dept: ORTHOPEDIC SURGERY | Facility: CLINIC | Age: 84
End: 2019-01-29

## 2019-01-31 ENCOUNTER — CHART COPY (OUTPATIENT)
Age: 84
End: 2019-01-31

## 2019-02-13 ENCOUNTER — APPOINTMENT (OUTPATIENT)
Dept: ORTHOPEDIC SURGERY | Facility: CLINIC | Age: 84
End: 2019-02-13
Payer: MEDICARE

## 2019-02-13 PROCEDURE — 20610 DRAIN/INJ JOINT/BURSA W/O US: CPT | Mod: 50

## 2019-02-13 PROCEDURE — 99213 OFFICE O/P EST LOW 20 MIN: CPT | Mod: 25

## 2019-02-13 NOTE — PHYSICAL EXAM
[FreeTextEntry2] : This patient now has some increased redness in his lower leg with his stasis dermatitis.  There is no local heat and there does not appear to be any evidence of infection.  He comes in for the severe arthritis in his.  His knee exam is not changed.  Pain over his medial and lateral joint with compression of his patella.  His ligaments are stable.  \par \par

## 2019-02-13 NOTE — PROCEDURE
[de-identified] : Procedure Note:\par \par Anatomic Location:  Left Knee\par \par Diagnosis:  Arthritis\par \par Procedure:  Injection of 2cc of Marcaine 0.5% plain and Depo-Medrol 1 cc, 4 mg.\par \par Local Spray: Ethyl Chloride.\par \par Preparation of the skin with Alcohol.\par \par Skin Preparation with Alcohol\par \par Patient has consented for the procedure.\par \par Injection  through a lateral parapatella approach.\par \par Patient tolerated the procedure well.\par \par Patient instructed to call the office if any reaction, fever, chills, increased erythema or swelling.   [phone].\par \par \par \par Procedure Note:\par \par Anatomic Location:  Right Knee\par \par Diagnosis:  Arthritis\par \par Procedure:  Injection of 2 cc of Marcaine 0.5% plain and Celestone 1cc (6 MG)\par \par Local Spray: Ethyl Chloride.\par \par Skin preparation with Alcohol.\par \par \par Patient has consented for the procedure.\par \par Injection  through a lateral parapatella approach.\par \par Patient tolerated the procedure well.\par \par Patient instructed to call the office if any reaction, fever, chills, increased erythema or swelling.   [phone].\par \par \par

## 2019-02-13 NOTE — HISTORY OF PRESENT ILLNESS
[Pain] : pain [All Hx] : past medical, family, and social [All] : medication and allergy [All Other ROS Normal] : All other review of systems are negative except as noted [Joint Pain] : joint pain [FreeTextEntry1] : Bilateral knee pain f/u [FreeTextEntry2] : Pt is an 86 y/o male c/o left knee pain. He has been managing with cortisone injections.  He presents for reevaluation.\par

## 2019-02-13 NOTE — DISCUSSION/SUMMARY
[de-identified] : The patient tolerated the local injection well.  Will return for reevaluation in 3 months.

## 2019-04-01 ENCOUNTER — RX RENEWAL (OUTPATIENT)
Age: 84
End: 2019-04-01

## 2019-04-15 ENCOUNTER — APPOINTMENT (OUTPATIENT)
Dept: CARDIOLOGY | Facility: CLINIC | Age: 84
End: 2019-04-15
Payer: MEDICARE

## 2019-04-15 ENCOUNTER — NON-APPOINTMENT (OUTPATIENT)
Age: 84
End: 2019-04-15

## 2019-04-15 VITALS
HEART RATE: 68 BPM | HEIGHT: 70 IN | OXYGEN SATURATION: 98 % | DIASTOLIC BLOOD PRESSURE: 73 MMHG | SYSTOLIC BLOOD PRESSURE: 121 MMHG | WEIGHT: 207 LBS | BODY MASS INDEX: 29.63 KG/M2

## 2019-04-15 DIAGNOSIS — R60.9 EDEMA, UNSPECIFIED: ICD-10-CM

## 2019-04-15 DIAGNOSIS — I50.9 HEART FAILURE, UNSPECIFIED: ICD-10-CM

## 2019-04-15 PROCEDURE — 93000 ELECTROCARDIOGRAM COMPLETE: CPT

## 2019-04-15 PROCEDURE — 99214 OFFICE O/P EST MOD 30 MIN: CPT

## 2019-04-15 NOTE — PHYSICAL EXAM
[General Appearance - Well Developed] : well developed [Normal Appearance] : normal appearance [Well Groomed] : well groomed [General Appearance - Well Nourished] : well nourished [No Deformities] : no deformities [General Appearance - In No Acute Distress] : no acute distress [Normal Conjunctiva] : the conjunctiva exhibited no abnormalities [Eyelids - No Xanthelasma] : the eyelids demonstrated no xanthelasmas [Normal Oral Mucosa] : normal oral mucosa [No Oral Pallor] : no oral pallor [No Oral Cyanosis] : no oral cyanosis [Normal Jugular Venous A Waves Present] : normal jugular venous A waves present [Normal Jugular Venous V Waves Present] : normal jugular venous V waves present [No Jugular Venous Tolliver A Waves] : no jugular venous tolliver A waves [Respiration, Rhythm And Depth] : normal respiratory rhythm and effort [Exaggerated Use Of Accessory Muscles For Inspiration] : no accessory muscle use [Normal] : normal [Normal Rate] : normal [Rhythm Regular] : regular [Normal S1] : normal S1 [Normal S2] : normal S2 [Distant] : the heart sounds were distant [No Murmur] : no murmurs heard [___ +] : bilateral [unfilled]U+ pretibial pitting edema [Abdomen Soft] : soft [Abdomen Tenderness] : non-tender [Abdomen Mass (___ Cm)] : no abdominal mass palpated [FreeTextEntry1] : 4 plus not pitting edema. [Skin Color & Pigmentation] : normal skin color and pigmentation [] : no rash [No Venous Stasis] : no venous stasis [Skin Lesions] : no skin lesions [No Skin Ulcers] : no skin ulcer [No Xanthoma] : no  xanthoma was observed [Oriented To Time, Place, And Person] : oriented to person, place, and time [Mood] : the mood was normal [Affect] : the affect was normal [No Anxiety] : not feeling anxious

## 2019-04-15 NOTE — REASON FOR VISIT
[Follow-Up - Clinic] : a clinic follow-up of [Atrial Fibrillation] : atrial fibrillation [Coronary Artery Disease] : coronary artery disease [FreeTextEntry1] : avr [Formal Caregiver] : formal caregiver

## 2019-04-15 NOTE — HISTORY OF PRESENT ILLNESS
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  recently treated with oral antibiotics for lower extremity cellultits, which appears to have resolved.  teeth to be extracted, discussed with dentist.\par \par recent hospitalization for back pain.  small left pleural effusion + -  empymea.  but no clinical infection \par he may need hermia surgery and will requre cardiac clearance/  left inguinal hernia with colon.\par \par \par 7/17.  pt states that for the last two months.  starts with dizziness, says vision is dark.  longest episode is five minutes.  he has never passed out.  he has not sought medical evaluation for these sx.  he is mostly seated and walks rarely.  his last evaluation by opthalmology was before this problem started.\par \par \par 11/13/2017  s/p pacemaker implant.  three or four days after PPM implant pt was [facility] with fever chills, bacteremia.  he received IV abx for 5 days and then discharged on oral abx (flagyl).. pt and aid do not know source.\par \par 4/30/2018  had nose bleed last week.  stopped Xarelto for two days,  bleeding stopped.  also told to see ENt for evaluation which he did not do.  dorota had significant swelling of both legs.\par \par 4/15/2019  first office visit in a year.  usual care giver is not with him.  no medical records of treatment or evaluation of the last year.receiving wound care for the leg.  reached his usual care giver by phone. she tells me there are no new issues and he is presenting for the usual follow up care.

## 2019-04-15 NOTE — DISCUSSION/SUMMARY
[Paroxysmal Atrial Fibrillation] : paroxysmal atrial fibrillation [Stable] : stable [___ Month(s)] : [unfilled] month(s) [FreeTextEntry4] : s/p AVR [FreeTextEntry1] : very stable s/p AVR.  schedule yearly echo to assess avr.  maintain current medicatios.\par \par first visit in over a year.  no eval done last year.  remained on same meds.\par needs echo, needs PPM check,

## 2019-04-17 ENCOUNTER — APPOINTMENT (OUTPATIENT)
Dept: ELECTROPHYSIOLOGY | Facility: CLINIC | Age: 84
End: 2019-04-17
Payer: MEDICARE

## 2019-04-17 VITALS
OXYGEN SATURATION: 96 % | WEIGHT: 207 LBS | HEART RATE: 70 BPM | DIASTOLIC BLOOD PRESSURE: 83 MMHG | SYSTOLIC BLOOD PRESSURE: 131 MMHG | BODY MASS INDEX: 29.63 KG/M2 | HEIGHT: 70 IN

## 2019-04-17 PROCEDURE — 93281 PM DEVICE PROGR EVAL MULTI: CPT

## 2019-04-24 ENCOUNTER — APPOINTMENT (OUTPATIENT)
Dept: ORTHOPEDIC SURGERY | Facility: CLINIC | Age: 84
End: 2019-04-24
Payer: MEDICARE

## 2019-04-24 DIAGNOSIS — M17.11 UNILATERAL PRIMARY OSTEOARTHRITIS, RIGHT KNEE: ICD-10-CM

## 2019-04-24 PROCEDURE — 20610 DRAIN/INJ JOINT/BURSA W/O US: CPT | Mod: LT

## 2019-04-24 PROCEDURE — 99212 OFFICE O/P EST SF 10 MIN: CPT | Mod: 25

## 2019-04-24 NOTE — PROCEDURE
[de-identified] : Procedure Note:\par \par Anatomic Location:  Left Knee\par \par Diagnosis:  Arthritis\par \par Procedure:  Injection of 2cc of Marcaine 0.5% plain and Depo-Medrol 1 cc, 4 mg.\par \par Local Spray: Ethyl Chloride.\par \par Preparation of the skin with Alcohol.\par \par Skin Preparation with Alcohol\par \par Patient has consented for the procedure.\par \par Injection  through a lateral parapatella approach.\par \par Patient tolerated the procedure well.\par \par Patient instructed to call the office if any reaction, fever, chills, increased erythema or swelling.   [phone].\par \par \par \par Procedure Note:\par \par Anatomic Location:  Right Knee\par \par Diagnosis:  Arthritis\par \par Procedure:  Injection of 2 cc of Marcaine 0.5% plain and Celestone 1cc (6 MG)\par \par Local Spray: Ethyl Chloride.\par \par Skin preparation with Alcohol.\par \par \par Patient has consented for the procedure.\par \par Injection  through a lateral parapatella approach.\par \par Patient tolerated the procedure well.\par \par Patient instructed to call the office if any reaction, fever, chills, increased erythema or swelling.   [phone].\par \par \par

## 2019-04-24 NOTE — PHYSICAL EXAM
[FreeTextEntry2] : Patient is really having no major change in his arthritis.  The problem is with his multiple medical problems surgery would not be the safest of procedure he will continue with conservative measures.  His stasis dermatitis on the left is somewhat improved his motion and pain is the same.  He does get relief he says from Celestone.  \par \par  \par \par

## 2019-04-24 NOTE — DISCUSSION/SUMMARY
[de-identified] : The patient tolerated the local injection well.  Will return for reevaluation in 3 months.

## 2019-04-24 NOTE — HISTORY OF PRESENT ILLNESS
[Pain] : pain [All Hx] : past medical, family, and social [All] : medication and allergy [All Other ROS Normal] : All other review of systems are negative except as noted [Joint Pain] : joint pain [de-identified] : Pt is an 87 y/o male c/o left knee pain.  He has been managing with cortisone injections.  He would like to continue conservative management and have another cortisone injection today.  \par

## 2019-04-30 ENCOUNTER — MEDICATION RENEWAL (OUTPATIENT)
Age: 84
End: 2019-04-30

## 2019-06-11 ENCOUNTER — MEDICATION RENEWAL (OUTPATIENT)
Age: 84
End: 2019-06-11

## 2019-06-11 RX ORDER — OXYCODONE AND ACETAMINOPHEN 5; 325 MG/1; MG/1
5-325 TABLET ORAL
Qty: 60 | Refills: 0 | Status: ACTIVE | COMMUNITY
Start: 2019-06-11 | End: 1900-01-01

## 2019-07-17 NOTE — PROCEDURE
[de-identified] : Procedure Note:\par \par Anatomic Location:  Left Knee\par \par Diagnosis:  Arthritis\par \par Procedure:  Injection of 2cc of Marcaine 0.5% plain and Celestone 1 cc, 6 mg\par \par Local Spray: Ethyl Chloride.\par \par Preparation of the skin with Alcohol.\par \par Skin Preparation with Alcohol\par \par Patient has consented for the procedure.\par \par Injection  through a lateral parapatella approach.\par \par Patient tolerated the procedure well.\par \par Patient instructed to call the office if any reaction, fever, chills, increased erythema or swelling.   [phone].\par \par \par \par Procedure Note:\par \par Anatomic Location:  Right Knee\par \par Diagnosis:  Arthritis\par \par Procedure:  Injection of 2 cc of Marcaine 0.5% plain and Celestone 1cc (6 MG)\par \par Local Spray: Ethyl Chloride.\par \par Skin preparation with Alcohol.\par \par \par Patient has consented for the procedure.\par \par Injection  through a lateral parapatella approach.\par \par Patient tolerated the procedure well.\par \par Patient instructed to call the office if any reaction, fever, chills, increased erythema or swelling.   [phone].\par \par \par

## 2019-07-17 NOTE — DISCUSSION/SUMMARY
[de-identified] : Discussion was had with the patient we will follow his left knee arthritis in his right total knee but he must see a vascular doctor and his medical doctors to better control his leg pain in his stasis dermatitis I also have sent him to a pain control doctor for his taking of the different narcotics.  This will be have to followed by these other doctors.  I told him we will focus just on the arthritis in his left knee but his pain is more in his lower leg and related to his medical condition and swelling.

## 2019-07-17 NOTE — HISTORY OF PRESENT ILLNESS
[de-identified] : Pt is an 88 y/o male c/o bilateral knee pain. He has been managing with cortisone injections. He last received them in April 2019 which helped for some time. He would like to continue conservative management and have another cortisone injection into each knee today. \par He is seeing a wound doctor for fluid collections in his left lower extremity.\par Patient was called back via telephone on 7/10/19 after he requested more Endocet where it was explained that he wouldn't be prescribed Endocet anymore by our office and that if he requires more pain medicine, he is to go to a pain management doctor. Patient wanted to discuss all of this during today's visit.

## 2019-07-17 NOTE — PHYSICAL EXAM
[FreeTextEntry2] : This patient has not changed significantly orthopedically.  Medically he has used stasis dermatitis and swelling of both legs his right total knee replacement is doing satisfactory but he has lower leg pain bilaterally.  His left knee has severe arthritis and again he will be given cortisone injection because he says it gives him a great amount.  \par \par

## 2019-10-28 NOTE — PHYSICAL EXAM
[General Appearance - Well Developed] : well developed [Normal Appearance] : normal appearance [Well Groomed] : well groomed [General Appearance - Well Nourished] : well nourished [No Deformities] : no deformities [General Appearance - In No Acute Distress] : no acute distress [Normal Conjunctiva] : the conjunctiva exhibited no abnormalities [Eyelids - No Xanthelasma] : the eyelids demonstrated no xanthelasmas [Normal Oral Mucosa] : normal oral mucosa [No Oral Pallor] : no oral pallor [No Oral Cyanosis] : no oral cyanosis [Normal Jugular Venous A Waves Present] : normal jugular venous A waves present [Normal Jugular Venous V Waves Present] : normal jugular venous V waves present [No Jugular Venous Tolliver A Waves] : no jugular venous tolliver A waves [Respiration, Rhythm And Depth] : normal respiratory rhythm and effort [Exaggerated Use Of Accessory Muscles For Inspiration] : no accessory muscle use [___ +] : bilateral [unfilled]U+ pretibial pitting edema [Abdomen Soft] : soft [Abdomen Tenderness] : non-tender [Abdomen Mass (___ Cm)] : no abdominal mass palpated [Skin Color & Pigmentation] : normal skin color and pigmentation [] : no rash [No Venous Stasis] : no venous stasis [Skin Lesions] : no skin lesions [No Skin Ulcers] : no skin ulcer [No Xanthoma] : no  xanthoma was observed [Oriented To Time, Place, And Person] : oriented to person, place, and time [Affect] : the affect was normal [Mood] : the mood was normal [No Anxiety] : not feeling anxious [FreeTextEntry1] : 4 plus not pitting edema.

## 2019-10-28 NOTE — DISCUSSION/SUMMARY
[Chronic Systolic Heart Failure] : chronic systolic congestive heart failure [Decompensated] : decompensated [___ Month(s)] : [unfilled] month(s) [FreeTextEntry1] : very stable s/p AVR.  schedule yearly echo to assess avr.  maintain current medicatios.\par \par 10/28/2019 remote AVR,  markedly fluid overloaded.  increase Lasix to 80 mg bid.  increase potassium to 20 meq.  follow up in one month.

## 2019-10-28 NOTE — REASON FOR VISIT
[Follow-Up - Clinic] : a clinic follow-up of [Atrial Fibrillation] : atrial fibrillation [Coronary Artery Disease] : coronary artery disease [FreeTextEntry1] : avr

## 2019-10-28 NOTE — HISTORY OF PRESENT ILLNESS
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  recently treated with oral antibiotics for lower extremity cellultits, which appears to have resolved.  teeth to be extracted, discussed with dentist.\par \par recent hospitalization for back pain.  small left pleural effusion + -  empymea.  but no clinical infection \par he may need hermia surgery and will requre cardiac clearance/  left inguinal hernia with colon.\par \par \par 7/17.  pt states that for the last two months.  starts with dizziness, says vision is dark.  longest episode is five minutes.  he has never passed out.  he has not sought medical evaluation for these sx.  he is mostly seated and walks rarely.  his last evaluation by opthalmology was before this problem started.\par \par \par 11/13/2017  s/p pacemaker implant.  three or four days after PPM implant pt was taken to [facility] with fever chills, bacteremia.  he received IV abx for 5 days and then discharged on oral abx (flagyl).. pt and aid do not know source.\par \par 4/30/2018  had nose bleed last week.  stopped Xarelto for two days,  bleeding stopped.  also told to see ENt for evaluation which he did not do.  dorota had significant swelling of both legs.\par \par 4/15/2019  first office visit in a year.  usual care giver is not with him.  no medical records of treatment or evaluation of the last year.receiving wound care for the leg.  reached his usual care giver by phone. she tells me there are no new issues and he is presenting for the usual follow up care.\par \par 10/28/2019  s/p remote AVR. CABG and afib.  PPM..  wheelchair bound.  no acute cardiac complaints.

## 2019-11-13 PROBLEM — Z87.01 HISTORY OF PNEUMONIA: Status: RESOLVED | Noted: 2019-01-01 | Resolved: 2019-01-01

## 2019-11-13 PROBLEM — I25.2 HISTORY OF MYOCARDIAL INFARCTION: Status: RESOLVED | Noted: 2019-01-01 | Resolved: 2019-01-01

## 2019-11-13 NOTE — HISTORY OF PRESENT ILLNESS
[FreeTextEntry1] : The wound is located on both Legs. Pt states he has had edematous Legs for several years & develops blisters & then they heal- this left leg  blister started 2 weeks ago- pt went to his PCP (Dr Vero Duvall) & pt was  referred to [facility].

## 2019-11-13 NOTE — VITALS
[Pain related to present condition?] : The patient's  pain is not related to present condition. [de-identified] : Pt denies c/o any pains or discomforts at present

## 2019-11-13 NOTE — ASSESSMENT
[Verbal] : Verbal [Demo] : Demo [Patient] : Patient [Caregiver] : Caregiver [Fair - mild discomfort, physical impairment, low acceptance] : Fair - mild discomfort, physical impairment, low acceptance [Verbalizes knowledge/Understanding] : Verbalizes knowledge/understanding [Dressing changes] : dressing changes [Skin Care] : skin care [Pressure relief] : pressure relief [Signs and symptoms of infection] : sign and symptoms of infection [Venous Disease] : venous disease [How and When to Call] : how and when to call [Labs and Tests] : labs and tests [Off-loading] : off-loading [Compression Therapy] : compression therapy [Patient responsibility to plan of care] : patient responsibility to plan of care [] : Yes [Stable] : stable [Home] : Home [Walker] : Walker [FreeTextEntry2] : Alteration in skin integrity- promote optimal skin integrity\par  [FreeTextEntry4] : Dr Bradley/ Photos taken\par Vascular studies ordered by Dr Bradley - preauth sheet submitted\par Pt's caregiver states pt has appointment to Vascular surgeon (Dr Crabtree) for Consult tomorrow- Dr Bradley aware\par F/U to [facility] in 1 week

## 2019-11-13 NOTE — PLAN
[FreeTextEntry1] : Vascular testing\par Patient to keep appointment with vascular surgeon tomorrow\par Wound veil and silver alginate dressings\par Elevate legs\par Return one week

## 2019-11-13 NOTE — PHYSICAL EXAM
[Normal Breath Sounds] : Normal breath sounds [1+] : left 1+ [0] : right 0 [Ankle Swelling Bilaterally] : bilaterally  [Ankle Swelling (On Exam)] : present [Varicose Veins Of Lower Extremities] : present [Ankle Swelling On The Left] : moderate [Skin Ulcer] : ulcer [Alert] : alert [Oriented to Person] : oriented to person [Calm] : calm [Oriented to Time] : oriented to time [Oriented to Place] : oriented to place [JVD] : no jugular venous distention  [] : not present [Abdomen Tenderness] : ~T ~M No abdominal tenderness [Purpura] : no purpura  [Petechiae] : no petechiae [Skin Induration] : no induration [de-identified] : WDWN slightly confused elderly male in NAD [de-identified] : Clear [de-identified] : Healed surgical scar overlying right knee. [de-identified] : Irregular rhythm without murmurs [de-identified] : Open ulcer with dermis exposed right lower leg. Ulcer with dermis exposed left lower leg with intact clear fluid-filled blister present. Both legs and feet markedly edematous. [FreeTextEntry2] : 5.5 [FreeTextEntry1] : Lateral Leg- Blister- partially intact/ partially open [de-identified] : Intact [FreeTextEntry3] : 5.5 [de-identified] : Serosanguineous [FreeTextEntry7] : Leg- a few scattered weeping areas [de-identified] : Wound veil then Silver Alginate/ Dry Dressing & Kerlix [de-identified] : Cleansed with Normal saline\par  [de-identified] : Wound veil then Silver Alginate/ Dry Dressing & Kerlix [de-identified] : Serosanguineous [de-identified] : Cleansed with Normal saline\par  [de-identified] : Cleansed with Normal saline\par  [de-identified] : Leg- a few scattered small weeping areas [TWNoteComboBox1] : Left [TWNoteComboBox4] : Small [de-identified] : CIRCULATION\par Dorsalis Pedis: R Doppler  L Doppler\par Posterior Tibialis: R Doppler L Doppler\par Extremity Color: Pigmented\par Extremity Temperature: Warm\par Capillary Refill: > 3 seconds bilaterally\par SHIRLEY: Vascular studies ordered by Dr Marisol cristobal sheet submitted\par \par \par  [de-identified] : None [de-identified] : 100% [de-identified] : None [de-identified] : No [TWNoteComboBox9] : Right [de-identified] : Right [de-identified] : Small

## 2019-11-13 NOTE — REASON FOR VISIT
[Consultation] : a consultation visit [Formal Caregiver] : formal caregiver [FreeTextEntry1] : New Evaluation

## 2019-11-19 NOTE — REVIEW OF SYSTEMS
[Eyesight Problems] : eyesight problems [Shortness Of Breath] : shortness of breath [Heart Rate Is Fast] : fast heart rate [SOB on Exertion] : shortness of breath during exertion [Heartburn] : heartburn [Incontinence] : incontinence [Joint Pain] : joint pain [Joint Swelling] : joint swelling [Joint Stiffness] : joint stiffness [Skin Wound] : skin wound [Easy Bruising] : a tendency for easy bruising [Negative] : Endocrine

## 2019-11-19 NOTE — PHYSICAL EXAM
[1+] : left 1+ [0] : left 0 [Ankle Swelling (On Exam)] : present [Ankle Swelling Bilaterally] : severe [Varicose Veins Of Lower Extremities] : bilaterally [Ankle Swelling On The Left] : moderate [Skin Ulcer] : ulcer [Alert] : alert [Oriented to Person] : oriented to person [Oriented to Place] : oriented to place [Oriented to Time] : oriented to time [Calm] : calm [4 x 4] : 4 x 4  [Abdominal Pad] : Abdominal Pad [JVD] : no jugular venous distention  [] : not present [Abdomen Tenderness] : ~T ~M No abdominal tenderness [Purpura] : no purpura  [Petechiae] : no petechiae [Skin Induration] : no induration [de-identified] : Supple [de-identified] : WDWN  elderly male in NAD [de-identified] : Irregular rhythm without murmurs [de-identified] : Healed surgical scar overlying right knee. [de-identified] : Ulcer right lower leg with clean and viable dermis exposed. Ulcer lower left leg with exposure of clean and viable dermis. Serous drainage present at both sites. Both legs and feet massively edematous. [FreeTextEntry1] : Left Lateral Leg [FreeTextEntry2] : 6.5 [FreeTextEntry3] : 6.2 [FreeTextEntry4] : 0.1 [de-identified] : Serosanguineous [de-identified] : Intact [de-identified] : Wound Veil, Silver Alginate [de-identified] : Cleansed with Normal Saline\par  [FreeTextEntry7] : Right Leg [FreeTextEntry8] : 0.8 [FreeTextEntry9] : 0.8 [de-identified] : 0.1 [de-identified] : Serosanguineous [de-identified] : Intact [de-identified] : Wound Veil, Silver Alginate [de-identified] : Cleansed with Normal Saline\par  [TWNoteComboBox4] : Moderate [TWNoteComboBox5] : No [de-identified] : None [de-identified] : No [de-identified] : None [de-identified] : 100% [de-identified] : No [de-identified] : Ace wraps [de-identified] : 3x Weekly [de-identified] : Small [de-identified] : No [de-identified] : No [de-identified] : None [de-identified] : None [de-identified] : 100% [de-identified] : No [de-identified] : Ace wraps [de-identified] : 3x Weekly

## 2019-11-19 NOTE — PLAN
[FreeTextEntry1] : Wound veil, silver alginate, Ace wraps to both lower legs\par Elevate legs\par Patient urged to take his diuretics as prescribed and to follow-up with his cardiologist\par Patient to have vascular testing next week\par Return one week.

## 2019-11-19 NOTE — HISTORY OF PRESENT ILLNESS
[FreeTextEntry1] : The patient is an 87 year old male who presents for follow-up of venous stasis ulcers of both lower legs. He takes his diuretics only irregularly, and his latest blister drained, creating a larger ulcer of his left lower leg.

## 2019-11-19 NOTE — VITALS
[Pain related to present condition?] : The patient's  pain is related to present condition. [Burning] : burning [] : No [FreeTextEntry3] : Left Leg

## 2019-11-19 NOTE — ASSESSMENT
[Verbal] : Verbal [Demo] : Demo [Patient] : Patient [Good - alert, interested, motivated] : Good - alert, interested, motivated [Dressing changes] : dressing changes [Verbalizes knowledge/Understanding] : Verbalizes knowledge/understanding [Skin Care] : skin care [How and When to Call] : how and when to call [Signs and symptoms of infection] : sign and symptoms of infection [Patient responsibility to plan of care] : patient responsibility to plan of care [] : Yes [Ambulatory] : Ambulatory [Home] : Home [Stable] : stable [Not Applicable - Long Term Care/Home Health Agency] : Long Term Care/Home Health Agency: Not Applicable [FreeTextEntry2] : Restore Skin Integrity\par Infection Control\par Localized wound care\par  [FreeTextEntry4] : Pt has Appointment Monday 11/25/19 for Vascular Studies \par F/U to [facility] in 1 week\par Initiation for Home Care Submitted NW

## 2019-11-25 PROBLEM — I83.028 VARICOSE VEINS OF L LOW EXTREM W ULCER OTH PART OF LOWER LEG: Status: ACTIVE | Noted: 2019-01-01

## 2019-11-25 PROBLEM — L03.116 CELLULITIS OF LEFT LOWER EXTREMITY WITHOUT FOOT: Status: ACTIVE | Noted: 2019-01-01

## 2019-11-25 NOTE — VITALS
[Pain related to present condition?] : The patient's  pain is related to present condition. [] : No [FreeTextEntry1] : when Dressings Changed [FreeTextEntry3] : Bilateral Legs [FreeTextEntry4] : Pain Meds

## 2019-11-25 NOTE — ASSESSMENT
[Verbal] : Verbal [Patient] : Patient [Demo] : Demo [Good - alert, interested, motivated] : Good - alert, interested, motivated [Verbalizes knowledge/Understanding] : Verbalizes knowledge/understanding [Dressing changes] : dressing changes [Skin Care] : skin care [Signs and symptoms of infection] : sign and symptoms of infection [How and When to Call] : how and when to call [Home Health] : home health [Compression Therapy] : compression therapy [Patient responsibility to plan of care] : patient responsibility to plan of care [Stable] : stable [Home] : Home [Wheelchair] : Wheelchair [Faxed - Long Term Care/Home Health Agency] : Long Term Care/Home Health Agency: Faxed [] : No [FreeTextEntry2] : Restore Optimal Skin Integrity\par Infection Prevention\par Compression Therapy \par Elevation\par Proper Diuretic Regimen [FreeTextEntry4] : MD reviewed Vascular studies with Pt. Pt verbalized understanding.\par MD sent antibiotics to Pt pharmacy for Cellulitis.\par Pt to F/U to [facility] in 1 Week  [FreeTextEntry1] : [facility]

## 2019-11-25 NOTE — HISTORY OF PRESENT ILLNESS
[FreeTextEntry1] : The patient is an 87 year old male who presents for follow-up of venous stasis ulcers of both lower legs. He takes a diuretic, but not in the way it was prescribed. His edema is worsening, and he c/o pain in his left lower leg with new onset cellulitis.

## 2019-11-25 NOTE — REVIEW OF SYSTEMS
[Eyesight Problems] : eyesight problems [Heart Rate Is Fast] : fast heart rate [Shortness Of Breath] : shortness of breath [SOB on Exertion] : shortness of breath during exertion [Heartburn] : heartburn [Incontinence] : incontinence [Joint Pain] : joint pain [Joint Stiffness] : joint stiffness [Easy Bruising] : a tendency for easy bruising [Skin Wound] : skin wound [Joint Swelling] : joint swelling [Negative] : Endocrine

## 2019-11-25 NOTE — PLAN
[FreeTextEntry1] : Will start Doxycycline for cellulitis left lower leg\par Wound veil and silver alginate dressings to both ulcer sites\par Ace wraps and leg elevation.\par Patient urged to take diuretics as prescribed\par Return one week.\par If diuresis is unsuccessful, patient may require hospitalization.

## 2019-11-25 NOTE — PHYSICAL EXAM
[4 x 4] : 4 x 4  [1+] : right 1+ [Ankle Swelling (On Exam)] : present [0] : left 0 [Varicose Veins Of Lower Extremities] : bilaterally [Ankle Swelling On The Left] : moderate [Ankle Swelling Bilaterally] : severe [Skin Ulcer] : ulcer [Alert] : alert [Oriented to Place] : oriented to place [Oriented to Person] : oriented to person [Oriented to Time] : oriented to time [Calm] : calm [JVD] : no jugular venous distention  [Purpura] : no purpura  [Abdomen Tenderness] : ~T ~M No abdominal tenderness [] : not present [Skin Induration] : no induration [Petechiae] : no petechiae [de-identified] : Irregular rhythm without murmurs [de-identified] : Supple [de-identified] : WDWN  elderly male in NAD [de-identified] : Ulcer right lower leg small and clean. Ulcer left lower leg clean with new skin present centrally. Skin of left lower leg erythematous and warm. Both legs and feet markedly edematous. [de-identified] : Healed surgical scar overlying right knee. [FreeTextEntry1] : Lateral Leg- New epithelium with in measurement [FreeTextEntry2] : 5.2 [FreeTextEntry4] : 0.1 [FreeTextEntry3] : 4.4 [de-identified] : Serosanguinous  [FreeTextEntry7] : Leg [de-identified] : Wound Veil & Silver Alginate  [de-identified] : Cleansed with Normal Saline.  [FreeTextEntry9] : 0.3 [de-identified] : 0.1 [FreeTextEntry8] : 0.4 [de-identified] : Scant Serosanguinous  [de-identified] : Wound Veil & Silver Alginate  [de-identified] : Cleansed with Normal Saline.  [TWNoteComboBox1] : Left [de-identified] : No [TWNoteComboBox4] : Small [de-identified] : Erythema [de-identified] : None [de-identified] : None [de-identified] : 100% [de-identified] : No [de-identified] : Ace wraps [de-identified] : 3x Weekly [TWNoteComboBox9] : Right [de-identified] : No [de-identified] : Normal [de-identified] : None [de-identified] : None [de-identified] : 100% [de-identified] : No [de-identified] : Ace wraps [de-identified] : 3x Weekly

## 2019-12-05 NOTE — VITALS
[Pain related to present condition?] : The patient's  pain is related to present condition. [] : No [FreeTextEntry3] : Bilateral Legs [FreeTextEntry1] : when dressing is fresh

## 2019-12-05 NOTE — HISTORY OF PRESENT ILLNESS
[FreeTextEntry1] : The patient is an 87 year old male who presents for follow-up of venous stasis ulcers of both lower legs, cellulitis, and weeping edema. He is now taking his diuretic properly and has been on Doxycycline orally. He is doing better.

## 2019-12-05 NOTE — ASSESSMENT
[Verbal] : Verbal [Patient] : Patient [Demo] : Demo [Verbalizes knowledge/Understanding] : Verbalizes knowledge/understanding [Good - alert, interested, motivated] : Good - alert, interested, motivated [Family member] : Family member [Dressing changes] : dressing changes [Skin Care] : skin care [Signs and symptoms of infection] : sign and symptoms of infection [How and When to Call] : how and when to call [Compression Therapy] : compression therapy [Patient responsibility to plan of care] : patient responsibility to plan of care [Home Health] : home health [Stable] : stable [Home] : Home [Faxed - Long Term Care/Home Health Agency] : Long Term Care/Home Health Agency: Faxed [Wheelchair] : Wheelchair [] : Yes [FreeTextEntry2] : Compression Therapy\par Restore Optimal Skin Integrity\par Elevation [FreeTextEntry4] : Pt to F/U to [facility] in 1 Week  [FreeTextEntry1] : [facility]

## 2019-12-05 NOTE — PHYSICAL EXAM
[4 x 4] : 4 x 4  [1+] : left 1+ [0] : left 0 [Ankle Swelling (On Exam)] : present [Ankle Swelling Bilaterally] : severe [Varicose Veins Of Lower Extremities] : bilaterally [Ankle Swelling On The Left] : moderate [Skin Ulcer] : ulcer [Alert] : alert [Oriented to Person] : oriented to person [Oriented to Place] : oriented to place [Oriented to Time] : oriented to time [Calm] : calm [JVD] : no jugular venous distention  [] : not present [Abdomen Tenderness] : ~T ~M No abdominal tenderness [Purpura] : no purpura  [Petechiae] : no petechiae [Skin Induration] : no induration [de-identified] : WDWN  elderly male in NAD [de-identified] : Irregular rhythm without murmurs [de-identified] : Healed surgical scar overlying right knee. [de-identified] : Edema both legs and feet improved. All ulcers and cellulitis resolved. Small hematoma of skin left lower leg. [FreeTextEntry1] : Lateral Leg- Closed [de-identified] : No Treatment  [de-identified] : Cleansed with Normal Saline.  [de-identified] : Cleansed with Normal Saline.  [FreeTextEntry7] : Leg- Closed [de-identified] : No Treatment   [de-identified] : Wound Veil & Silver Alginate  [de-identified] : Scant Serous  [de-identified] : Lateral Leg- Weeping hematoma &  Scattered open areas [de-identified] : Intact  [TWNoteComboBox1] : Left [de-identified] : Cleansed with Normal Saline.  [de-identified] : No [TWNoteComboBox4] : Small [de-identified] : None [de-identified] : Erythema [de-identified] : None [de-identified] : No [de-identified] : Ace wraps [de-identified] : 100% [de-identified] : No [de-identified] : Normal [TWNoteComboBox9] : Right [de-identified] : 3x Weekly [de-identified] : None [de-identified] : 100% [de-identified] : None [de-identified] : Ace wraps [de-identified] : 3x Weekly [de-identified] : No [de-identified] : Left [de-identified] : other [de-identified] : None [de-identified] : None [de-identified] : Ace wraps [de-identified] : 100% [de-identified] : No [de-identified] : 3x Weekly

## 2019-12-05 NOTE — REVIEW OF SYSTEMS
[Eyesight Problems] : eyesight problems [Heart Rate Is Fast] : fast heart rate [Shortness Of Breath] : shortness of breath [SOB on Exertion] : shortness of breath during exertion [Heartburn] : heartburn [Incontinence] : incontinence [Joint Pain] : joint pain [Joint Swelling] : joint swelling [Skin Wound] : skin wound [Joint Stiffness] : joint stiffness [Easy Bruising] : a tendency for easy bruising [Negative] : Endocrine

## 2019-12-05 NOTE — PLAN
[FreeTextEntry1] : Wound veil and silver alginate to hematoma left lower leg. Ace wraps to both lower legs\par Continue diuretics\par Complete antibiotic\par Return one week.

## 2019-12-12 NOTE — HISTORY OF PRESENT ILLNESS
[FreeTextEntry1] : The patient is an 87 year old male who presents for follow-up of venous stasis ulcers of his lower legs. He c/o pain in his left Achilles area at night. The ulcers have closed. His severe edema persists despite diuretics. His vascular testing is indicative of arteriosclerotic changes.

## 2019-12-12 NOTE — ASSESSMENT
[Verbal] : Verbal [Demo] : Demo [Patient] : Patient [Family member] : Family member [Good - alert, interested, motivated] : Good - alert, interested, motivated [Verbalizes knowledge/Understanding] : Verbalizes knowledge/understanding [Dressing changes] : dressing changes [Signs and symptoms of infection] : sign and symptoms of infection [Skin Care] : skin care [Home Health] : home health [Compression Therapy] : compression therapy [How and When to Call] : how and when to call [Patient responsibility to plan of care] : patient responsibility to plan of care [Stable] : stable [Wheelchair] : Wheelchair [Home] : Home [Faxed - Long Term Care/Home Health Agency] : Long Term Care/Home Health Agency: Faxed [] : No [FreeTextEntry4] : Pt referred to Vascular Specialist. Pt wants to see Dr. Crabtree.\par Pt to F/U to [facility] in 2 Weeks [FreeTextEntry2] : Edema Control\par Elevation\par Home Care  [FreeTextEntry1] : [facility]

## 2019-12-12 NOTE — VITALS
[Sharp] : sharp [Tender] : tender [FreeTextEntry3] : Left Leg [Pain related to present condition?] : The patient's  pain is not related to present condition. [] : No [FreeTextEntry5] : PMD prescribed an Antibiotic [FreeTextEntry1] : when dressing is changed, avoiding pressure

## 2019-12-12 NOTE — PHYSICAL EXAM
[4 x 4] : 4 x 4  [1+] : right 1+ [0] : left 0 [Ankle Swelling (On Exam)] : present [Ankle Swelling Bilaterally] : severe [Varicose Veins Of Lower Extremities] : bilaterally [Ankle Swelling On The Left] : moderate [Alert] : alert [Oriented to Person] : oriented to person [Oriented to Place] : oriented to place [Oriented to Time] : oriented to time [Calm] : calm [JVD] : no jugular venous distention  [] : not present [Abdomen Tenderness] : ~T ~M No abdominal tenderness [Petechiae] : no petechiae [Purpura] : no purpura  [Skin Ulcer] : no ulcer [Skin Induration] : no induration [de-identified] : WDWN  elderly male in NAD [de-identified] : Irregular rhythm without murmurs [de-identified] : Healed surgical scar overlying right knee. [de-identified] : All ulcer sites now closed with dry scabs. Both legs and feet edematous. No odor, no pus, no cellulitis, no induration, no tenderness.. [FreeTextEntry1] : Lateral Leg- Closed [de-identified] : No Treatment  [de-identified] : Cleansed with Normal Saline.  [de-identified] : Cleansed with Normal Saline.  [de-identified] : No Treatment   [FreeTextEntry7] : Leg- Closed [de-identified] : Dry Protective Dressing  [de-identified] : Cleansed with Normal Saline.  [de-identified] : Lateral Leg- Closed [de-identified] : Ace wraps [TWNoteComboBox1] : Left [TWNoteComboBox9] : Right [de-identified] : Ace wraps [de-identified] : Left [de-identified] : None [de-identified] : Normal [de-identified] : None [de-identified] : None [de-identified] : No [de-identified] : Ace wraps [de-identified] : 100% [de-identified] : 3x Weekly

## 2019-12-12 NOTE — PLAN
[FreeTextEntry1] : Dry protective dressing and Ace wraps\par Patient to see vascular surgeon re: pain in left ankle at night\par Patient to speak to his PMD concerning better diuresis\par Return two weeks.

## 2019-12-12 NOTE — REVIEW OF SYSTEMS
[Eyesight Problems] : eyesight problems [SOB on Exertion] : shortness of breath during exertion [Heart Rate Is Fast] : fast heart rate [Shortness Of Breath] : shortness of breath [Incontinence] : incontinence [Joint Pain] : joint pain [Heartburn] : heartburn [Joint Swelling] : joint swelling [Joint Stiffness] : joint stiffness [Easy Bruising] : a tendency for easy bruising [Skin Wound] : skin wound [Negative] : Endocrine

## 2019-12-26 NOTE — ASSESSMENT
[Verbal] : Verbal [Demo] : Demo [Patient] : Patient [Family member] : Family member [Good - alert, interested, motivated] : Good - alert, interested, motivated [Verbalizes knowledge/Understanding] : Verbalizes knowledge/understanding [Dressing changes] : dressing changes [Skin Care] : skin care [Signs and symptoms of infection] : sign and symptoms of infection [How and When to Call] : how and when to call [Patient responsibility to plan of care] : patient responsibility to plan of care [Home Health] : home health [Compression Therapy] : compression therapy [Stable] : stable [Home] : Home [Faxed - Long Term Care/Home Health Agency] : Long Term Care/Home Health Agency: Faxed [Walker] : Walker [FreeTextEntry2] : Compression Therapy \par Edema Control/Elevation\par Maintain Skin Integrity  [] : No [FreeTextEntry4] : Pt has appointment in @ 2 weeks with Dr. Crabtree. (Vascular Specialist(\par Pt has appointment with Cardiologist tomorrow to recheck Pacemaker.\par Pt to F/U to [facility] in 2 Weeks [FreeTextEntry1] : [facility]

## 2019-12-26 NOTE — PLAN
[FreeTextEntry1] : Dry protective dressings with Ace wraps\par Patient to keep appointment with vascular surgeon\par Return two weeks

## 2019-12-26 NOTE — PHYSICAL EXAM
[1+] : right 1+ [0] : left 0 [Ankle Swelling (On Exam)] : present [Ankle Swelling Bilaterally] : severe [Varicose Veins Of Lower Extremities] : present [Ankle Swelling On The Left] : moderate [Oriented to Person] : oriented to person [Alert] : alert [Oriented to Time] : oriented to time [Oriented to Place] : oriented to place [Calm] : calm [JVD] : no jugular venous distention  [] : not present [Abdomen Tenderness] : ~T ~M No abdominal tenderness [Purpura] : no purpura  [Petechiae] : no petechiae [Skin Ulcer] : no ulcer [Skin Induration] : no induration [de-identified] : WDWN  elderly male in NAD [de-identified] : Irregular rhythm without murmurs [de-identified] : Healed surgical scar overlying right knee. [de-identified] : No open ulcer sites on either lower leg. Several dry scabs present diffusely both lower legs. Edema of both lower legs and present but improved. No sign of infection. [FreeTextEntry1] : Lateral Leg- Closed [de-identified] : No Treatment  [FreeTextEntry7] : Leg- Closed [de-identified] : Cleansed with Normal Saline.  [de-identified] : Cleansed with Normal Saline. Kerlix  [de-identified] : Dry Protective Dressing    [de-identified] : Circulatory and neuromuscular function WNL post ACE  [de-identified] : Circulatory and neuromuscular function WNL post ACE  [de-identified] : Lateral Leg- Closed [de-identified] : Scattered dry scabs [de-identified] : Dry Protective Dressing  [de-identified] : Cleansed with Normal Saline. Kerlix  [TWNoteComboBox9] : Right [TWNoteComboBox1] : Left [de-identified] : Ace wraps [de-identified] : 2x Weekly [de-identified] : other [de-identified] : None [de-identified] : Left [de-identified] : Ace wraps [de-identified] : 2x Weekly

## 2019-12-26 NOTE — HISTORY OF PRESENT ILLNESS
[FreeTextEntry1] : The patient is an 87 year old male who presents for follow-up of venous stasis ulcers of his lower legs. He takes a diuretic daily, his edema is improved, and his ulcers have closed.

## 2019-12-26 NOTE — REVIEW OF SYSTEMS
[Eyesight Problems] : eyesight problems [Heart Rate Is Fast] : fast heart rate [Shortness Of Breath] : shortness of breath [SOB on Exertion] : shortness of breath during exertion [Incontinence] : incontinence [Heartburn] : heartburn [Joint Pain] : joint pain [Joint Swelling] : joint swelling [Joint Stiffness] : joint stiffness [Easy Bruising] : a tendency for easy bruising [Skin Wound] : skin wound [Negative] : Endocrine

## 2019-12-26 NOTE — VITALS
[Pain related to present condition?] : The patient's  pain is related to present condition. [Tender] : tender [] : No [FreeTextEntry3] : Bilateral Calves  [FreeTextEntry1] : when Dressing Changed & elevation

## 2019-12-27 PROBLEM — I44.1 2ND DEGREE AV BLOCK: Status: ACTIVE | Noted: 2019-01-01

## 2019-12-27 PROBLEM — I47.2 NSVT (NONSUSTAINED VENTRICULAR TACHYCARDIA): Status: ACTIVE | Noted: 2019-01-01

## 2020-01-01 ENCOUNTER — APPOINTMENT (OUTPATIENT)
Dept: WOUND CARE | Facility: HOSPITAL | Age: 85
End: 2020-01-01
Payer: MEDICARE

## 2020-01-01 ENCOUNTER — OUTPATIENT (OUTPATIENT)
Dept: OUTPATIENT SERVICES | Facility: HOSPITAL | Age: 85
LOS: 1 days | End: 2020-01-01
Payer: MEDICARE

## 2020-01-01 ENCOUNTER — APPOINTMENT (OUTPATIENT)
Dept: CARDIOLOGY | Facility: CLINIC | Age: 85
End: 2020-01-01
Payer: MEDICARE

## 2020-01-01 ENCOUNTER — APPOINTMENT (OUTPATIENT)
Dept: ELECTROPHYSIOLOGY | Facility: CLINIC | Age: 85
End: 2020-01-01
Payer: MEDICARE

## 2020-01-01 ENCOUNTER — OUTPATIENT (OUTPATIENT)
Dept: OUTPATIENT SERVICES | Facility: HOSPITAL | Age: 85
LOS: 1 days | Discharge: HOME | End: 2020-01-01
Payer: MEDICARE

## 2020-01-01 ENCOUNTER — APPOINTMENT (OUTPATIENT)
Dept: CV DIAGNOSITCS | Facility: HOSPITAL | Age: 85
End: 2020-01-01

## 2020-01-01 ENCOUNTER — NON-APPOINTMENT (OUTPATIENT)
Age: 85
End: 2020-01-01

## 2020-01-01 ENCOUNTER — APPOINTMENT (OUTPATIENT)
Dept: WOUND CARE | Facility: HOSPITAL | Age: 85
End: 2020-01-01

## 2020-01-01 VITALS — HEART RATE: 76 BPM | SYSTOLIC BLOOD PRESSURE: 126 MMHG | DIASTOLIC BLOOD PRESSURE: 66 MMHG

## 2020-01-01 VITALS
BODY MASS INDEX: 29.63 KG/M2 | DIASTOLIC BLOOD PRESSURE: 69 MMHG | SYSTOLIC BLOOD PRESSURE: 132 MMHG | RESPIRATION RATE: 20 BRPM | WEIGHT: 207 LBS | HEART RATE: 72 BPM | HEIGHT: 70 IN | TEMPERATURE: 97.1 F | OXYGEN SATURATION: 95 %

## 2020-01-01 VITALS
RESPIRATION RATE: 20 BRPM | SYSTOLIC BLOOD PRESSURE: 125 MMHG | TEMPERATURE: 97.3 F | DIASTOLIC BLOOD PRESSURE: 60 MMHG | HEART RATE: 62 BPM | OXYGEN SATURATION: 97 %

## 2020-01-01 VITALS — RESPIRATION RATE: 20 BRPM | OXYGEN SATURATION: 96 % | HEART RATE: 75 BPM

## 2020-01-01 DIAGNOSIS — Z96.651 PRESENCE OF RIGHT ARTIFICIAL KNEE JOINT: ICD-10-CM

## 2020-01-01 DIAGNOSIS — Z95.4 PRESENCE OF OTHER HEART-VALVE REPLACEMENT: ICD-10-CM

## 2020-01-01 DIAGNOSIS — I83.228 VARICOSE VEINS OF LEFT LOWER EXTREMITY WITH BOTH ULCER OF OTHER PART OF LOWER EXTREMITY AND INFLAMMATION: ICD-10-CM

## 2020-01-01 DIAGNOSIS — M17.0 BILATERAL PRIMARY OSTEOARTHRITIS OF KNEE: ICD-10-CM

## 2020-01-01 DIAGNOSIS — R32 UNSPECIFIED URINARY INCONTINENCE: ICD-10-CM

## 2020-01-01 DIAGNOSIS — I48.91 UNSPECIFIED ATRIAL FIBRILLATION: ICD-10-CM

## 2020-01-01 DIAGNOSIS — H54.7 UNSPECIFIED VISUAL LOSS: ICD-10-CM

## 2020-01-01 DIAGNOSIS — I83.018 VARICOSE VEINS OF RIGHT LOWER EXTREMITY WITH ULCER OTHER PART OF LOWER LEG: ICD-10-CM

## 2020-01-01 DIAGNOSIS — I42.9 CARDIOMYOPATHY, UNSPECIFIED: ICD-10-CM

## 2020-01-01 DIAGNOSIS — I35.0 NONRHEUMATIC AORTIC (VALVE) STENOSIS: ICD-10-CM

## 2020-01-01 DIAGNOSIS — Z95.1 PRESENCE OF AORTOCORONARY BYPASS GRAFT: ICD-10-CM

## 2020-01-01 DIAGNOSIS — I25.810 ATHEROSCLEROSIS OF CORONARY ARTERY BYPASS GRAFT(S) WITHOUT ANGINA PECTORIS: ICD-10-CM

## 2020-01-01 DIAGNOSIS — K21.9 GASTRO-ESOPHAGEAL REFLUX DISEASE WITHOUT ESOPHAGITIS: ICD-10-CM

## 2020-01-01 DIAGNOSIS — I50.9 HEART FAILURE, UNSPECIFIED: ICD-10-CM

## 2020-01-01 DIAGNOSIS — Z95.0 PRESENCE OF CARDIAC PACEMAKER: ICD-10-CM

## 2020-01-01 DIAGNOSIS — E78.5 HYPERLIPIDEMIA, UNSPECIFIED: ICD-10-CM

## 2020-01-01 DIAGNOSIS — L97.811 NON-PRESSURE CHRONIC ULCER OF OTHER PART OF RIGHT LOWER LEG LIMITED TO BREAKDOWN OF SKIN: ICD-10-CM

## 2020-01-01 DIAGNOSIS — Z82.61 FAMILY HISTORY OF ARTHRITIS: ICD-10-CM

## 2020-01-01 DIAGNOSIS — Z79.899 OTHER LONG TERM (CURRENT) DRUG THERAPY: ICD-10-CM

## 2020-01-01 DIAGNOSIS — Z82.0 FAMILY HISTORY OF EPILEPSY AND OTHER DISEASES OF THE NERVOUS SYSTEM: ICD-10-CM

## 2020-01-01 DIAGNOSIS — Z87.01 PERSONAL HISTORY OF PNEUMONIA (RECURRENT): ICD-10-CM

## 2020-01-01 DIAGNOSIS — M79.671 PAIN IN RIGHT FOOT: ICD-10-CM

## 2020-01-01 DIAGNOSIS — Z79.82 LONG TERM (CURRENT) USE OF ASPIRIN: ICD-10-CM

## 2020-01-01 DIAGNOSIS — L97.821 NON-PRESSURE CHRONIC ULCER OF OTHER PART OF LEFT LOWER LEG LIMITED TO BREAKDOWN OF SKIN: ICD-10-CM

## 2020-01-01 DIAGNOSIS — L97.819 VARICOSE VEINS OF RIGHT LOWER EXTREMITY WITH ULCER OTHER PART OF LOWER LEG: ICD-10-CM

## 2020-01-01 DIAGNOSIS — L97.801 VARICOSE VEINS OF LEFT LOWER EXTREMITY WITH BOTH ULCER OF OTHER PART OF LOWER EXTREMITY AND INFLAMMATION: ICD-10-CM

## 2020-01-01 DIAGNOSIS — I44.1 ATRIOVENTRICULAR BLOCK, SECOND DEGREE: ICD-10-CM

## 2020-01-01 PROCEDURE — 93306 TTE W/DOPPLER COMPLETE: CPT | Mod: 26

## 2020-01-01 PROCEDURE — 93296 REM INTERROG EVL PM/IDS: CPT

## 2020-01-01 PROCEDURE — 93294 REM INTERROG EVL PM/LDLS PM: CPT

## 2020-01-01 PROCEDURE — 99213 OFFICE O/P EST LOW 20 MIN: CPT

## 2020-01-01 PROCEDURE — G0463: CPT

## 2020-01-01 PROCEDURE — 99214 OFFICE O/P EST MOD 30 MIN: CPT

## 2020-01-01 PROCEDURE — C8929: CPT

## 2020-01-01 PROCEDURE — 93000 ELECTROCARDIOGRAM COMPLETE: CPT

## 2020-01-01 RX ORDER — OXYCODONE AND ACETAMINOPHEN 5; 325 MG/1; MG/1
5-325 TABLET ORAL
Qty: 90 | Refills: 0 | Status: DISCONTINUED | COMMUNITY
Start: 2018-03-28 | End: 2020-01-01

## 2020-01-01 RX ORDER — GABAPENTIN 100 MG/1
100 CAPSULE ORAL DAILY
Refills: 0 | Status: DISCONTINUED | COMMUNITY
End: 2020-01-01

## 2020-01-01 RX ORDER — LIDOCAINE 5 G/100G
5 OINTMENT TOPICAL
Qty: 1 | Refills: 0 | Status: DISCONTINUED | COMMUNITY
Start: 2018-02-08 | End: 2020-01-01

## 2020-01-01 RX ORDER — OXYCODONE AND ACETAMINOPHEN 5; 325 MG/1; MG/1
5-325 TABLET ORAL
Qty: 90 | Refills: 0 | Status: DISCONTINUED | COMMUNITY
Start: 2018-08-07 | End: 2020-01-01

## 2020-01-01 RX ORDER — LIDOCAINE 5 G/100G
5 OINTMENT TOPICAL
Qty: 1 | Refills: 2 | Status: DISCONTINUED | COMMUNITY
Start: 2018-05-15 | End: 2020-01-01

## 2020-01-01 RX ORDER — LEVOFLOXACIN 500 MG/1
500 TABLET, FILM COATED ORAL DAILY
Qty: 10 | Refills: 0 | Status: DISCONTINUED | COMMUNITY
End: 2020-01-01

## 2020-01-01 RX ORDER — OXYCODONE AND ACETAMINOPHEN 5; 325 MG/1; MG/1
5-325 TABLET ORAL
Qty: 90 | Refills: 0 | Status: DISCONTINUED | COMMUNITY
Start: 2019-01-29 | End: 2020-01-01

## 2020-01-01 RX ORDER — OXYCODONE AND ACETAMINOPHEN 5; 325 MG/1; MG/1
5-325 TABLET ORAL
Qty: 90 | Refills: 0 | Status: DISCONTINUED | COMMUNITY
Start: 2018-02-08 | End: 2020-01-01

## 2020-01-01 RX ORDER — OXYCODONE AND ACETAMINOPHEN 5; 325 MG/1; MG/1
5-325 TABLET ORAL
Qty: 90 | Refills: 0 | Status: DISCONTINUED | COMMUNITY
Start: 2018-05-15 | End: 2020-01-01

## 2020-01-01 RX ORDER — LIDOCAINE HCL 4 %
4 LIQUID ROLL-ON (ML) TOPICAL
Qty: 3 | Refills: 2 | Status: DISCONTINUED | COMMUNITY
Start: 2018-05-15 | End: 2020-01-01

## 2020-01-01 RX ORDER — GABAPENTIN 100 MG/1
100 CAPSULE ORAL
Refills: 0 | Status: ACTIVE | COMMUNITY

## 2020-01-01 RX ORDER — OXYCODONE AND ACETAMINOPHEN 5; 325 MG/1; MG/1
5-325 TABLET ORAL
Qty: 90 | Refills: 0 | Status: DISCONTINUED | COMMUNITY
Start: 2017-12-06 | End: 2020-01-01

## 2020-01-01 RX ORDER — METOPROLOL TARTRATE 50 MG/1
50 TABLET, FILM COATED ORAL TWICE DAILY
Qty: 90 | Refills: 3 | Status: ACTIVE | COMMUNITY
Start: 2017-11-13 | End: 1900-01-01

## 2020-01-01 RX ORDER — OXYCODONE AND ACETAMINOPHEN 5; 325 MG/1; MG/1
5-325 TABLET ORAL
Qty: 90 | Refills: 0 | Status: DISCONTINUED | COMMUNITY
Start: 2017-10-12 | End: 2020-01-01

## 2020-01-01 RX ORDER — POTASSIUM CHLORIDE 1.5 G/1
20 POWDER, FOR SOLUTION ORAL DAILY
Qty: 90 | Refills: 3 | Status: ACTIVE | COMMUNITY
Start: 2020-01-01 | End: 1900-01-01

## 2020-01-01 RX ORDER — DICLOFENAC SODIUM 10 MG/G
1 GEL TOPICAL DAILY
Qty: 2 | Refills: 2 | Status: DISCONTINUED | COMMUNITY
Start: 2019-04-24 | End: 2020-01-01

## 2020-01-01 RX ORDER — RIVAROXABAN 10 MG/1
10 TABLET, FILM COATED ORAL
Refills: 0 | Status: ACTIVE | COMMUNITY

## 2020-01-01 RX ORDER — OXYCODONE AND ACETAMINOPHEN 5; 325 MG/1; MG/1
5-325 TABLET ORAL
Qty: 90 | Refills: 0 | Status: DISCONTINUED | COMMUNITY
Start: 2019-04-30 | End: 2020-01-01

## 2020-01-01 RX ORDER — LIDOCAINE 5 G/100G
5 OINTMENT TOPICAL
Qty: 1 | Refills: 2 | Status: DISCONTINUED | COMMUNITY
Start: 2017-12-06 | End: 2020-01-01

## 2020-01-07 NOTE — REVIEW OF SYSTEMS
[Eyesight Problems] : eyesight problems [Heart Rate Is Fast] : fast heart rate [Shortness Of Breath] : shortness of breath [SOB on Exertion] : shortness of breath during exertion [Incontinence] : incontinence [Heartburn] : heartburn [Joint Pain] : joint pain [Joint Stiffness] : joint stiffness [Joint Swelling] : joint swelling [Easy Bruising] : a tendency for easy bruising [Skin Wound] : skin wound [Negative] : Endocrine

## 2020-01-07 NOTE — HISTORY OF PRESENT ILLNESS
[FreeTextEntry1] : The patient is an 87 year old male who presents for follow-up of venous stasis ulcers of his lower legs. He is taking a diuretic, and the ulcers are closed. He has been deemed by his vascular surgeon at too high risk to undergo arterial reconstruction.

## 2020-01-07 NOTE — VITALS
[Pain related to present condition?] : The patient's  pain is related to present condition. [Throbbing] : throbbing [Tender] : tender [Occasional] : occasional [] : No [de-identified] : 5/10 [FreeTextEntry3] : Left leg  [FreeTextEntry1] : Percocet  [FreeTextEntry2] : Pain comes randomly  [FreeTextEntry4] : Wound care per MD order

## 2020-01-07 NOTE — PHYSICAL EXAM
[1+] : right 1+ [0] : left 0 [Ankle Swelling (On Exam)] : present [Ankle Swelling Bilaterally] : bilaterally  [Varicose Veins Of Lower Extremities] : bilaterally [Ankle Swelling On The Left] : moderate [Alert] : alert [Oriented to Person] : oriented to person [Oriented to Place] : oriented to place [Oriented to Time] : oriented to time [Calm] : calm [JVD] : no jugular venous distention  [] : not present [Abdomen Tenderness] : ~T ~M No abdominal tenderness [Purpura] : no purpura  [Petechiae] : no petechiae [Skin Ulcer] : no ulcer [Skin Induration] : no induration [de-identified] : WDWN  elderly male in NAD [de-identified] : Supple [de-identified] : Irregular rhythm without murmurs [de-identified] : Healed surgical scar overlying right knee. [de-identified] : Both lower legs free of open ulcers and wounds. Edema of both lower legs and feet present. [de-identified] : + 4 edema  [FreeTextEntry1] : Right leg - Dry flaky skin - No open wounds [de-identified] : Light ACE. Expressed comfort post ACE application. [de-identified] : Asher  [de-identified] : Cleansed with NS\par  [de-identified] : +4 edema  [FreeTextEntry7] : Left leg - dry flaky skin - No open wounds  [de-identified] : Cleansed with NS\par  [de-identified] : Asher  [de-identified] : Ace wraps [de-identified] : 3x Weekly [de-identified] : Compression [de-identified] : Compression [de-identified] : 3x Weekly

## 2020-01-07 NOTE — PLAN
[FreeTextEntry1] : Ace wraps to both lower legs with stockinette.\par Return for re-evaluation in four weeks.

## 2020-01-07 NOTE — REASON FOR VISIT
[Follow-Up: _____] : a [unfilled] follow-up visit [Formal Caregiver] : formal caregiver [FreeTextEntry1] : s

## 2020-01-07 NOTE — ASSESSMENT
[Verbal] : Verbal [Written] : Written [Demo] : Demo [Patient] : Patient [Caregiver] : Caregiver [Home Health Provider] : Home Health Provider [Fair - mild discomfort, physical impairment, low acceptance] : Fair - mild discomfort, physical impairment, low acceptance [Verbalizes knowledge/Understanding] : Verbalizes knowledge/understanding [Dressing changes] : dressing changes [Skin Care] : skin care [Signs and symptoms of infection] : sign and symptoms of infection [How and When to Call] : how and when to call [Venous Disease] : venous disease [Pain Management] : pain management [Compression Therapy] : compression therapy [Home Health] : home health [Patient responsibility to plan of care] : patient responsibility to plan of care [] : Yes [Walker] : Walker [Stable] : stable [Home] : Home [Faxed - Long Term Care/Home Health Agency] : Long Term Care/Home Health Agency: Faxed [FreeTextEntry2] : Edema prevention \par Infection prevention \par Compression therapy  [FreeTextEntry4] : Patient saw vascular MD Dr. Crabtree. Patient stated Dr. Crabtree does not think vascular surgical intervention would be beneficial due to patients cardiac history. \par Patient following up with his cardiologist 1/22/20\par Follow up in 4 weeks  [FreeTextEntry1] : [facility]

## 2020-02-03 NOTE — HISTORY OF PRESENT ILLNESS
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  recently treated with oral antibiotics for lower extremity cellultits, which appears to have resolved.  teeth to be extracted, discussed with dentist.\par \par recent hospitalization for back pain.  small left pleural effusion + -  empymea.  but no clinical infection \par he may need hermia surgery and will requre cardiac clearance/  left inguinal hernia with colon.\par \par \par 7/17.  pt states that for the last two months.  starts with dizziness, says vision is dark.  longest episode is five minutes.  he has never passed out.  he has not sought medical evaluation for these sx.  he is mostly seated and walks rarely.  his last evaluation by opthalmology was before this problem started.\par \par \par 11/13/2017  s/p pacemaker implant.  three or four days after PPM implant pt was taken to [facility] with fever chills, bacteremia.  he received IV abx for 5 days and then discharged on oral abx (flagyl).. pt and aid do not know source.\par \par 4/30/2018  had nose bleed last week.  stopped Xarelto for two days,  bleeding stopped.  also told to see ENt for evaluation which he did not do.  dorota had significant swelling of both legs.\par \par 4/15/2019  first office visit in a year.  usual care giver is not with him.  no medical records of treatment or evaluation of the last year.receiving wound care for the leg.  reached his usual care giver by phone. she tells me there are no new issues and he is presenting for the usual follow up care.\par \par 10/28/2019  s/p remote AVR. CABG and afib.  PPM..  wheelchair bound.  no acute cardiac complaints.\par \par 2/3/2020   remote AVR and CABG, PPM.. being evaluiated actively by wound care.  in wheel chair

## 2020-02-03 NOTE — REASON FOR VISIT
[Follow-Up - Clinic] : a clinic follow-up of [Aortic Stenosis] : aortic stenosis [Peripheral Vascular Disease] : peripheral vascular disease [Fatigue] : feeling tired (fatigue)

## 2020-02-03 NOTE — PHYSICAL EXAM
[General Appearance - Well Developed] : well developed [Normal Appearance] : normal appearance [Well Groomed] : well groomed [General Appearance - Well Nourished] : well nourished [General Appearance - In No Acute Distress] : no acute distress [No Deformities] : no deformities [Normal Conjunctiva] : the conjunctiva exhibited no abnormalities [Eyelids - No Xanthelasma] : the eyelids demonstrated no xanthelasmas [Normal Oral Mucosa] : normal oral mucosa [No Oral Pallor] : no oral pallor [No Oral Cyanosis] : no oral cyanosis [Normal Jugular Venous V Waves Present] : normal jugular venous V waves present [Normal Jugular Venous A Waves Present] : normal jugular venous A waves present [No Jugular Venous Tolliver A Waves] : no jugular venous tolliver A waves [Exaggerated Use Of Accessory Muscles For Inspiration] : no accessory muscle use [Respiration, Rhythm And Depth] : normal respiratory rhythm and effort [Normal Rate] : normal [Rhythm Regular] : regular [Normal] : normal [Normal S2] : normal S2 [Normal S1] : normal S1 [No Murmur] : no murmurs heard [Left Carotid Bruit] : no bruit heard over the left carotid [Right Carotid Bruit] : no bruit heard over the right carotid [Rt] : varicose veins of the right leg noted [___ +] : bilateral [unfilled]U+ pretibial pitting edema [Abdomen Soft] : soft [Abdomen Tenderness] : non-tender [Abdomen Mass (___ Cm)] : no abdominal mass palpated [Skin Color & Pigmentation] : normal skin color and pigmentation [FreeTextEntry1] : 4 plus not pitting edema. [] : no rash [No Venous Stasis] : no venous stasis [Skin Lesions] : no skin lesions [No Skin Ulcers] : no skin ulcer [No Xanthoma] : no  xanthoma was observed [Oriented To Time, Place, And Person] : oriented to person, place, and time [Affect] : the affect was normal [No Anxiety] : not feeling anxious [Mood] : the mood was normal

## 2020-02-03 NOTE — DISCUSSION/SUMMARY
[___ Month(s)] : [unfilled] month(s) [FreeTextEntry1] : very stable s/p AVR.  schedule yearly echo to assess avr.  maintain current medicatios.\par \par 10/28/2019 remote AVR,  markedly fluid overloaded.  increase Lasix to 80 mg bid.  increase potassium to 20 meq.  follow up in one month.\par \par 2/3/2020  chronic lower extremity edema. being followed by vascular/wound care.  pt is chronically wheelchair bound,.  echo to periodically assess AV.  maintain diuretic,

## 2020-02-11 PROBLEM — L97.821 NON-PRS CHR ULCER OTH PRT L LOW LEG LIMITED TO BRKDWN SKIN: Status: ACTIVE | Noted: 2019-01-01

## 2020-02-11 PROBLEM — I83.018 VARICOSE VEINS OF R LOW EXTREM W ULCER OTH PART OF LOWER LEG: Status: ACTIVE | Noted: 2019-01-01

## 2020-02-11 PROBLEM — L97.811 NON-PRS CHR ULCER OTH PRT R LOW LEG LIMITED TO BRKDWN SKIN: Status: ACTIVE | Noted: 2019-01-01

## 2020-02-11 PROBLEM — I83.228 VARICOSE VEINS OF LOWER EXTREMITY WITH INFLAMMATION, WITH ULCER OF OTHER PART OF LOWER LEG LIMITED TO BREAKDOWN OF SKIN, UNSPECIFIED LATERALITY: Status: ACTIVE | Noted: 2019-01-01

## 2020-02-11 NOTE — PLAN
[FreeTextEntry1] : Patient to see his vascular surgeon again relative to his night heel pain\par No dressings needed at this time\par Return four weeks

## 2020-02-11 NOTE — REVIEW OF SYSTEMS
[Eyesight Problems] : eyesight problems [Heart Rate Is Fast] : fast heart rate [Shortness Of Breath] : shortness of breath [SOB on Exertion] : shortness of breath during exertion [Heartburn] : heartburn [Incontinence] : incontinence [Joint Pain] : joint pain [Joint Swelling] : joint swelling [Joint Stiffness] : joint stiffness [Skin Wound] : skin wound [Easy Bruising] : a tendency for easy bruising [Negative] : Endocrine

## 2020-02-11 NOTE — HISTORY OF PRESENT ILLNESS
[FreeTextEntry1] : The patient is an 88 year old male who presents for follow-up of venous stasis ulcers of both lower legs and chronic lymphedema. His ulcers remain closed, but he c/o pain in his heels in bed at night with relief when he hangs his legs down.

## 2020-02-11 NOTE — ASSESSMENT
[Verbal] : Verbal [Patient] : Patient [Demo] : Demo [Family member] : Family member [Verbalizes knowledge/Understanding] : Verbalizes knowledge/understanding [Good - alert, interested, motivated] : Good - alert, interested, motivated [Skin Care] : skin care [How and When to Call] : how and when to call [Compression Therapy] : compression therapy [Patient responsibility to plan of care] : patient responsibility to plan of care [Stable] : stable [Home] : Home [Walker] : Walker [Not Applicable - Long Term Care/Home Health Agency] : Long Term Care/Home Health Agency: Not Applicable [] : No [FreeTextEntry2] : Compression Therapy \par Edema Control/Elevation\par Maintain Skin Integrity  [FreeTextEntry4] : Pt to make an appointment with Dr. Crabtree. (Vascular Specialist) for Ischemic pain.\par Pt had an appointment with Cardiologist to recheck Pacemaker. PMD administers shots every 15 days for pacemaker.\par Pt to F/U to [facility] in 1 Month

## 2020-02-11 NOTE — PHYSICAL EXAM
[1+] : left 1+ [0] : left 0 [Ankle Swelling (On Exam)] : present [Ankle Swelling Bilaterally] : severe [Varicose Veins Of Lower Extremities] : bilaterally [Ankle Swelling On The Left] : moderate [Alert] : alert [Oriented to Person] : oriented to person [Oriented to Place] : oriented to place [Oriented to Time] : oriented to time [Calm] : calm [JVD] : no jugular venous distention  [Abdomen Tenderness] : ~T ~M No abdominal tenderness [Purpura] : no purpura  [] : not present [Skin Ulcer] : no ulcer [Skin Induration] : no induration [Petechiae] : no petechiae [de-identified] : WDWN  elderly male in NAD [de-identified] : Supple [de-identified] : Irregular rhythm without murmurs [de-identified] : Healed surgical scar overlying right knee. [de-identified] : No open wounds on either leg or foot. Both feet warm and viable. [FreeTextEntry1] : Lateral Leg- Closed [de-identified] : No Treatment  [FreeTextEntry7] : Leg- Closed [de-identified] : Circulatory and neuromuscular function WNL post ACE  [de-identified] : No Treatment  [TWNoteComboBox1] : Left [TWNoteComboBox9] : Right [de-identified] : Ace wraps [de-identified] : Left [de-identified] : 2x Weekly [de-identified] : Ace wraps [de-identified] : other [de-identified] : None [de-identified] : 2x Weekly

## 2020-07-27 ENCOUNTER — APPOINTMENT (OUTPATIENT)
Dept: ELECTROPHYSIOLOGY | Facility: CLINIC | Age: 85
End: 2020-07-27

## 2020-08-06 ENCOUNTER — FORM ENCOUNTER (OUTPATIENT)
Age: 85
End: 2020-08-06

## 2020-10-27 ENCOUNTER — APPOINTMENT (OUTPATIENT)
Dept: ELECTROPHYSIOLOGY | Facility: CLINIC | Age: 85
End: 2020-10-27

## 2021-10-10 NOTE — PHYSICAL THERAPY INITIAL EVALUATION ADULT - ADDITIONAL COMMENTS
Pt lives in a private home with wife. As per pt has HHA M-F 9am-5pm and 5pm-9pm and Sat/Sun 930am-530pm. Pt uses a RW for ambulation and requires assist from aides.
show
